# Patient Record
Sex: FEMALE | Employment: FULL TIME
[De-identification: names, ages, dates, MRNs, and addresses within clinical notes are randomized per-mention and may not be internally consistent; named-entity substitution may affect disease eponyms.]

---

## 2017-05-26 ENCOUNTER — HEALTH MAINTENANCE LETTER (OUTPATIENT)
Age: 42
End: 2017-05-26

## 2017-10-30 ENCOUNTER — OFFICE VISIT (OUTPATIENT)
Dept: FAMILY MEDICINE | Facility: CLINIC | Age: 42
End: 2017-10-30
Payer: COMMERCIAL

## 2017-10-30 VITALS
HEART RATE: 88 BPM | DIASTOLIC BLOOD PRESSURE: 87 MMHG | SYSTOLIC BLOOD PRESSURE: 138 MMHG | OXYGEN SATURATION: 96 % | TEMPERATURE: 98.2 F | HEIGHT: 60 IN | WEIGHT: 191.4 LBS | BODY MASS INDEX: 37.58 KG/M2

## 2017-10-30 DIAGNOSIS — Z86.39 HX OF THYROID NODULE: ICD-10-CM

## 2017-10-30 DIAGNOSIS — Z01.00 EXAMINATION OF EYES AND VISION: ICD-10-CM

## 2017-10-30 DIAGNOSIS — Z12.4 SCREENING FOR CERVICAL CANCER: ICD-10-CM

## 2017-10-30 DIAGNOSIS — Z13.29 SCREENING FOR THYROID DISORDER: ICD-10-CM

## 2017-10-30 DIAGNOSIS — Z00.00 ROUTINE GENERAL MEDICAL EXAMINATION AT A HEALTH CARE FACILITY: Primary | ICD-10-CM

## 2017-10-30 DIAGNOSIS — R06.83 SNORING: ICD-10-CM

## 2017-10-30 DIAGNOSIS — Z13.1 SCREENING FOR DIABETES MELLITUS: ICD-10-CM

## 2017-10-30 DIAGNOSIS — Z11.51 SCREENING FOR HUMAN PAPILLOMAVIRUS: ICD-10-CM

## 2017-10-30 DIAGNOSIS — Z13.220 SCREENING FOR LIPOID DISORDERS: ICD-10-CM

## 2017-10-30 LAB
CHOLEST SERPL-MCNC: 233 MG/DL
GLUCOSE BLD-MCNC: 101 MG/DL (ref 70–99)
HDLC SERPL-MCNC: 46 MG/DL
LDLC SERPL CALC-MCNC: 161 MG/DL
NONHDLC SERPL-MCNC: 187 MG/DL
TRIGL SERPL-MCNC: 129 MG/DL
TSH SERPL DL<=0.005 MIU/L-ACNC: 1.5 MU/L (ref 0.4–4)

## 2017-10-30 PROCEDURE — 87624 HPV HI-RISK TYP POOLED RSLT: CPT | Performed by: NURSE PRACTITIONER

## 2017-10-30 PROCEDURE — 80061 LIPID PANEL: CPT | Performed by: NURSE PRACTITIONER

## 2017-10-30 PROCEDURE — 99386 PREV VISIT NEW AGE 40-64: CPT | Performed by: NURSE PRACTITIONER

## 2017-10-30 PROCEDURE — 82947 ASSAY GLUCOSE BLOOD QUANT: CPT | Performed by: NURSE PRACTITIONER

## 2017-10-30 PROCEDURE — 36415 COLL VENOUS BLD VENIPUNCTURE: CPT | Performed by: NURSE PRACTITIONER

## 2017-10-30 PROCEDURE — 99212 OFFICE O/P EST SF 10 MIN: CPT | Mod: 25 | Performed by: NURSE PRACTITIONER

## 2017-10-30 PROCEDURE — G0145 SCR C/V CYTO,THINLAYER,RESCR: HCPCS | Performed by: NURSE PRACTITIONER

## 2017-10-30 PROCEDURE — 84443 ASSAY THYROID STIM HORMONE: CPT | Performed by: NURSE PRACTITIONER

## 2017-10-30 NOTE — PROGRESS NOTES
Pablo Mclaughlin,    Thank you for your recent office visit.    Here are your recent results.   Your cholesterol is elevated, try incorporating some of the below tips into your daily routine, we can repeat your fasting labs in one year.  Other labs are normal.     Feel free to contact me via [employer] or call the clinic at [phone].    Sincerely,    Cherie Ackerman, APRN, FNP-BC                     Controlling Cholesterol  What is cholesterol?   Cholesterol is a fatty substance. It has both good and bad effects on the body. Your body needs small amounts of cholesterol to make hormones and to build and maintain cells. However, when your body has too much cholesterol, deposits of fat called plaque form inside the walls of your blood vessels (arteries). The blood vessel walls thicken and the vessels become narrower. This is a condition called atherosclerosis. These changes make it harder for blood to flow through the blood vessels, increasing your risk of heart disease, heart attack, and stroke. The plaque can also easily break off and cause a blockage. When the artery is blocked, no blood can flow through it. This prevents the heart muscle from getting oxygen and can cause a heart attack. If a piece of plaque breaks off and flows to the brain, it can cause a stroke.   Most of the cholesterol in your blood is made by your liver from the fats, carbohydrates, and proteins you eat. You also get cholesterol by eating animal products such as meat, eggs, and high-fat dairy products such as whole milk, cream, and real butter.   It is important to find out what your cholesterol numbers are because lowering cholesterol levels that are too high lessens your risk for developing heart disease. It reduces the chance of a heart attack or death from heart disease, even if you already have heart disease.   How is cholesterol measured?   When you get your cholesterol checked, your healthcare provider will give you a number for your total  "cholesterol level. You can use the chart below to see if your total cholesterol is high.     Total Cholesterol Level (mg/dL)  ----------------------------------------  less than 200   good  200 to 239      borderline high  240 or above    high  ----------------------------------------    When your total cholesterol is measured and found to be high, your healthcare provider may also check the amount of LDL (low-density lipoprotein) and HDL (high-density lipoprotein) in your blood. LDL and HDL carry cholesterol through your blood. LDL carries a lot of cholesterol, leaves behind fatty deposits on your artery walls, and contributes to heart disease. HDL does the opposite. HDL cleans the artery walls and removes extra cholesterol from the body, thus lowering the risk of heart disease. LDL cholesterol is called bad cholesterol. (You can think of \"L\" for \"lousy\" cholesterol.) HDL cholesterol is called good cholesterol (think of \"H\" for \"healthy\" cholesterol). It is good to have low levels of LDL and high levels of HDL.   Because HDL cholesterol protects against heart disease, higher numbers are better. HDL levels of 60 mg/dL or more help to lower your risk for heart disease. A level equal to or less than 40 mg/dL is low and is considered a major risk factor because it increases your risk for developing heart disease   The level of LDL cholesterol that is healthy for you depends on your risk of heart disease and heart attack. In general, the higher your LDL level and the more risk factors you have for heart disease, the greater your chances of developing heart disease or having a heart attack. These are the recommended goals for LDL, according to risk level:   The goal is less than 160 mg/dL if your risk of heart disease is low.   The goal is less than 130 mg/dL if you have a moderate risk.   The goal is less than 100 mg/dL if you have a high risk of heart disease or you already have heart disease or diabetes.   For many " people with heart disease, especially if they also have diabetes, the goal is less than 70 mg/dL.   Lowering cholesterol, especially the LDL, is connected or linked with:   Slowing, stopping, or even reversing the buildup of plaque   Reducing the chances of heart attack by making plaques more stable and less likely to break off or rupture.   This means the chance of having a heart attack is much less.   In addition to high levels of total cholesterol and LDL, major risks for heart disease include:   diabetes   cigarette smoking   high blood pressure (140/90 mm Hg or higher or you are taking blood pressure medicine)   low HDL cholesterol (less than 40 mg/dL)   family history of early heart disease (father or brother diagnosed with heart disease before age 55, or mother or sister diagnosed before age 65)   age 45 or older for men and age 55 or older for women.   If you have diabetes, your risk of heart disease is high. If you do not have diabetes but you have 2 or more of the other risk factors in this list, your risk is moderate to high. Based on your personal and family history, your healthcare provider can help you calculate your risk level.   How can I control my cholesterol level?   High cholesterol may run in families. Know your family history and discuss it with your healthcare provider.   You can often control cholesterol levels by   eating right   exercising   not smoking   losing weight if you are overweight.   If you have a high risk for heart disease, your healthcare provider may prescribe cholesterol-lowering medicine as well as changes in lifestyle.   Eat right.  Follow these diet guidelines to help control your cholesterol:   Limit the cholesterol in your diet to less than 300 mg per day. If you have heart disease, limit cholesterol to less than 200 mg per day.   Be careful about the amounts and types of fat that you eat. Fats should contribute no more than 25 to 35% of your daily calories. Most of your  "dietary fat should be from polyunsaturated and monounsaturated fats, which are healthier than saturated fat and trans fats.   Saturated fat raises your blood cholesterol because it makes it hard for the body to clear the cholesterol away. Less than 7 to 10% of your calories should come from saturated fat. Saturated fat is found in different amounts in almost all foods. Butter, some oils, meat, and poultry fat contain a lot of saturated fat.   Trans fatty acids, often called trans fats, tend to raise your bad LDL cholesterol and lower your good HDL cholesterol. Trans fats naturally occur in some foods, mostly in meat and dairy products. But food makers can create trans fats when they are preparing food for grocery stores. This is usually done by adding hydrogen to fats. If the list of ingredients of a food product includes the words \"partially hydrogenated\" (usually referring to oils, such as soybean oil and others), the product is likely to contain trans fats. Try to eat as little trans fat as possible. As of January 2006, nutrition labels must list trans fats if the food contains them. Check the nutrition bar on the side of the package.   Polyunsaturated fats are found in fish and some vegetable oils. Monounsaturated fats are found in olive oil, canola oil, and avocados. Both types of these healthier fats are also found in many nuts and legumes.   Adjust the amount of calories you eat and exercise regularly, according to your healthcare provider's exercise prescription, to maintain your recommended body weight.   To control the cholesterol and types and amounts of fat you eat:   Check food labels for fat and cholesterol content. Choose the foods with less fat per serving.   Limit the amount of butter and margarine you eat.   Use olive, canola, sunflower, safflower, soybean, or corn oil. Avoid tropical oils such as palm or coconut oil. Also avoid oils that have been hydrogenated or partially hydrogenated.   Use " salad dressings and margarine made with polyunsaturated and monounsaturated fats.   Use egg whites or egg substitutes rather than whole eggs.   Replace whole-milk dairy products with nonfat or low-fat milk, cheese, spreads, and yogurt.   Eat skinless chicken, turkey, fish, and meatless entrees more often than red meat.   Choose lean cuts of meat and trim off all visible fat. Keep portion sizes moderate.   Avoid fatty desserts such as ice cream, cream-filled cakes, and cheesecakes. Choose fresh fruits, nonfat frozen yogurt, Popsicles, etc.   Reduce the amount of fried foods, vending machine food, and fast food you eat.   Eat several daily servings of fruits and vegetables (especially fresh fruits and leafy vegetables), beans, and whole grains (such as whole wheat, bran, brown rice, oats, and oatmeal). The fiber in these foods helps lower cholesterol.   Eat 4 to 5 servings of nuts a week. Examples of nuts that can be a part of a healthy diet are walnuts, almonds, hazelnuts, peanuts, pecans, and pistachio nuts.   Look for low-fat or nonfat varieties of the foods you like to eat, or look for substitutes.   Exercise.  Exercise goes hand-in-hand with a healthy diet for controlling cholesterol. Exercise helps because it:   Keeps your weight down.   Decreases your total cholesterol level.   Decreases your LDL (bad cholesterol).   Increases your HDL (good cholesterol).   A good exercise program includes aerobic exercise. Aerobic exercise is any activity that keeps your heart rate up (such as swimming, jogging, walking, and bicycling). You should get at least 30 minutes of moderate aerobic exercise most days of the week. Moderate aerobic exercise is generally defined as requiring the energy it takes to walk 2 miles in 30 minutes. You may need to exercise 60 minutes a day to prevent weight gain and 90 minutes a day to lose weight.   If you haven't been exercising, ask your healthcare provider for an exercise prescription and  "start your new exercise program slowly.   Don't smoke.  Do not smoke. Smoking increases your risk of heart disease because it lowers HDL levels, increases your risk of blood clots, and decreases oxygen to the tissues.   Lose excess weight.  Extra weight increases your risk for heart and blood vessel disease. One way it does this is by causing your LDL (\"bad\") cholesterol to go up. Extra weight can also make you tired. It takes a lot of energy to carry all those pounds around. The result is that you are less active. This can mean that you don't get enough exercise and gain even more weight.   Losing excess weight:   Improves not only the bad LDL cholesterol but other blood fats as well.   Lowers your risk for heart attack or stroke.   Increases your energy and helps you feel better (both physically and mentally) and become more active.   Your weight is primarily the result of 2 factors. One is the number of calories you consume. The other is the number of calories you \"burn\". If you eat more calories than you use, your body will store the extra calories as fat and your weight will go up. If your body uses more calories than you eat, you will lose weight.   Here are some things you can do to lose weight.   Talk to your healthcare provider about your weight. Ask how a change in diet and exercise will change your cholesterol levels. Plan for gradual weight loss, just 1 or 2 pounds per week   Eat fewer calories.   Get more physical activity.   Keep a diary of your food and exercise for a couple of weeks to become more aware of your habits.   In summary, changes that you can make in your lifestyle to control your cholesterol level are:   Eat healthy.   Get regular exercise.   Don't smoke.   Keep a healthy weight.   Have your cholesterol levels checked as often as your provider recommends.   Cholesterol in the Diet  Cholesterol is a fatty substance in your body and in foods made from animals. There is a lot of it in meat, " including beef, pork, chicken, and turkey. Whole-milk dairy products, egg yolks, and shellfish also have a lot of cholesterol.   Your body needs cholesterol to make hormones and build nerve cells. You don't have to get it from food because your body makes cholesterol. If you eat too many foods high in cholesterol or saturated fat you can get too much cholesterol. It can cause high levels of cholesterol in the blood. High cholesterol increases your risk for heart disease.   How are saturated fat and trans fats related to cholesterol levels?   Like cholesterol, saturated fats are found mostly in animal products. Limiting the saturated fat in your diet is just as important as limiting cholesterol because your body makes more cholesterol when you eat saturated fat. Trans fats are another type of fat in animal products. Trans fats are also in many processed foods, such as cakes, cookies and potato chips. Like saturated fat, trans fats raise cholesterol levels in the blood.   How much cholesterol do animal products have?   As the table below shows, some foods have more cholesterol and saturated fat than others. They may be high in both, or low in both, or high in one but not the other. The healthiest diets include mostly foods that are low in cholesterol, saturated, and trans fat.   Most foods in the meat group have about the same amount of cholesterol per serving, regardless of the type or cut of meat. However, the amount of saturated fat in these various meats can be very different. High-fat cuts, such as prime rib and dark-meat poultry with the skin, contain a lot more saturated fat than lean cuts, such as pork tenderloin and chicken breast without skin.   Whole-milk dairy products, such as whole milk, cheese, ice cream, sour cream, and butter, have a lot of cholesterol and saturated fat. The good news is that food producers can remove both cholesterol and saturated fat from dairy foods. When dairy is skimmed of its  fats, the cholesterol is skimmed off along with it. Skim (nonfat) dairy products are a healthy food choice.   Shellfish are low in saturated fat. Some shellfish are high in cholesterol, but the saturated fat is so low that these foods are still healthy. Fin fish, such as salmon, tuna, trout, and halibut, are relatively low in cholesterol and saturated fat.   Cholesterol and Saturated Fat Content of Selected Foods     Food                                 Fat             Cholesterol                                      (grams)         (milligrams)  --------------------------------------------------------------------  8 ounces (oz) whole milk             4.5 g               25 mg  8 ounces skim milk                   0.36 g               5 mg  1 tablespoon butter                  7 g                 30 mg  4 tablespoon sour cream              5.5 g               24 mg  3 oz. pork tenderloin                2 g                 65 mg  3 oz. pork sausage                   7.5 g               70 mg  3 oz sirloin steak                   3 g                 76 mg  3 oz beef ribs                       5 g                 69 mg  3 oz chicken breast without skin     1 g                 73 mg  3 oz chicken thigh with skin         3.7 g               79 mg  3 oz shrimp                          0.25               166 mg  3 oz salmon                          1.5                 50 mg  1/2 cup vegetable shortening        25.5 g                0 mg   ---------------------------------------------------------------------    How much cholesterol can I have in my diet?   The guidelines for cholesterol in the diet depend on your medical condition. The recommendations are:   less than 200 mg a day if you have high cholesterol or heart disease   less than 300 mg of cholesterol a day if you do not have high cholesterol or heart disease.   Everyone should try to avoid saturated and trans fats.   Limiting cholesterol, saturated fat, and trans  fat is easy if you get in the habit of cooking lean. Choose the leanest cuts of meats and dairy products, including more fish and less processed food. Some plant foods, such as palm oil, coconut oil, and cocoa butter do contain saturated fat, but it is not known if these fats have the same harmful effect on the heart as the saturated fat in animal products. Plant foods, such as grains, fruits, vegetables, vegetable oils, nuts, and seeds, do not contain any cholesterol.     Published by [employer].  This content is reviewed periodically and is subject to change as new health information becomes available. The information is intended to inform and educate and is not a replacement for medical evaluation, advice, diagnosis or treatment by a healthcare professional.   Ping Merida RD, CDE   ? 2010 [facility] and/or its affiliates. All Rights Reserved.

## 2017-10-30 NOTE — MR AVS SNAPSHOT
After Visit Summary   10/30/2017    Lissette England    MRN: [id number]           Patient Information     Date Of Birth          1975        Visit Information        Provider Department      10/30/2017 7:40 AM Cherie Ackerman NP [facility]        Today's Diagnoses     Hx of thyroid nodule    -  1    Screening for thyroid disorder        Screening for human papillomavirus        Screening for cervical cancer        Examination of eyes and vision        Screening for lipoid disorders        Screening for diabetes mellitus        Snoring          Care Instructions      Preventive Health Recommendations  Female Ages 40 to 49    Yearly exam:     See your health care provider every year in order to  1. Review health changes.   2. Discuss preventive care.    3. Review your medicines if your doctor prescribed any.      Get a Pap test every three years (unless you have an abnormal result and your provider advises testing more often).      If you get Pap tests with HPV test, you only need to test every 5 years, unless you have an abnormal result. You do not need a Pap test if your uterus was removed (hysterectomy) and you have not had cancer.      You should be tested each year for STDs (sexually transmitted diseases), if you're at risk.       Ask your doctor if you should have a mammogram.      Have a colonoscopy (test for colon cancer) if someone in your family has had colon cancer or polyps before age 50.       Have a cholesterol test every 5 years.       Have a diabetes test (fasting glucose) after age 45. If you are at risk for diabetes, you should have this test every 3 years.    Shots: Get a flu shot each year. Get a tetanus shot every 10 years.     Nutrition:     Eat at least 5 servings of fruits and vegetables each day.    Eat whole-grain bread, whole-wheat pasta and brown rice instead of white grains and rice.    Talk to your provider about Calcium and Vitamin D.      Lifestyle    Exercise at least 150 minutes a week (an average of 30 minutes a day, 5 days a week). This will help you control your weight and prevent disease.    Limit alcohol to one drink per day.    No smoking.     Wear sunscreen to prevent skin cancer.  See your dentist every six months for an exam and cleaning.  Monitoring Your Sleep: Home Sleep Study    A home sleep study tracks and records body functions while you re asleep in your own bed. The results of the study will help diagnose your sleep problem and plan your treatment.  How a home sleep study works  During a sleep study, sensors attached to your body measure your breathing, oxygen level, and other body functions. You will be shown how to attach the sensors to your body. You may also have help from a technician. At bedtime you plug the sensors into a small computer and turn it on. In the morning, you will remove the sensors and return the computer so the results can be studied.  Tips  You ll be given instructions for how to set up the sensors and the computer. Doing so will be simple. For best results:  Go through the instructions during the day so you ll be ready to use the equipment at bedtime.  Stick to your normal routine. Ask your healthcare provider if you should do anything differently the night of the study.  If you get up during the night, reconnect the sensors to the computer or to yourself correctly.  Get as many hours of sleep as you can.  Getting the results  The results of your sleep study need to be scored and interpreted. Once this is done, your healthcare provider will discuss the findings with you. The sleep study results will show whether you have apnea. This is when your breathing stops temporarily many times during the night, awakening you briefly.  It can also tell how severe the apnea is. The findings help your healthcare provider know which treatment or treatments may be the right ones for you.  Date Last Reviewed:  8/9/2015 2000-2017 [employer]. [address] [place] [address], [place] [address]. All rights reserved. This information is not intended as a substitute for professional medical care. Always follow your healthcare professional's instructions.        Tips to Help Prevent Snoring    Your snoring may get better if you make a few simple changes in your sleeping and waking habits. These changes might be all you need to improve or even cure your snoring, or they may work best when used along with other types of treatment.  Sleep on your side  Sleeping on your side may keep throat tissue from blocking your air passage. This may improve or even cure snoring. But it can be hard to stop sleeping on your back. Try sewing a pocket or sock onto the back of a T-shirt or pajama top. Put a few tennis balls or a bag of unshelled nuts into this pocket or sock, then wear the shirt to bed. This will help keep you from rolling onto your back. If this doesn't work, try wearing a backpack full of foam pieces, or put a wedge-shaped pillow behind you. You can [employer] purchase devices online.  Avoid alcohol and certain medicines  Alcohol and medicines such as sedatives, sleeping pills, and antihistamines make breathing slower and more shallow. They also make your muscles relax, so structures in your throat can block your air passage. These changes can cause or worsen snoring. If you snore, avoid alcohol. Talk to your healthcare provider if you take medicines to help you sleep.  Lose weight  Too much weight can make snoring worse. Extra weight puts pressure on your neck tissues and lungs, making breathing harder. If you're overweight, ask your healthcare provider about a weight-loss program.  Exercise regularly  Exercise can help you lose weight, tone your muscles, and make your lungs work better. These changes may help improve your snoring. Ask your healthcare provider about an exercise program like walking, or something else that you  enjoy.  Unblock your nose  If something blocks your nose, treating the problem may help improve snoring. Your healthcare provider can suggest medications for allergies or sinus problems. Nasal saline rinses can also open up the nasal passages.Nasal strips applied on the bridge of the nose can aid breathing. Surgery can straighten a deviated septum, reduce the size of the turbinates, or remove polyps (growths). If you smoke, try to quit because smoking makes a stuffy nose worse.  Understanding the risks of sleep apnea  In some cases, snoring is not physically harmful, but it can be associated with a more serious condition called sleep apnea. Some common symptoms of sleep apnea include:  Loud, frequent snoring  Heavy daytime drowsiness  Difficulty breathing during sleep  Headaches upon awakening  If you are concerned that you might have sleep apnea, talk with your healthcare provider about tests and treatments that may help.   Date Last Reviewed: 8/9/2015 2000-2017 [employer]. [address] [place] [address]. All rights reserved. This information is not intended as a substitute for professional medical care. Always follow your healthcare professional's instructions.                  Follow-ups after your visit        Additional Services     OPTOMETRY REFERRAL       Your provider has referred you to: FMG: [facility] [phone]   [email or web address]  FMG: [facility] [phone]    [email or web address]  FMG: [facility] [phone]    [email or web address]  FMG: [facility] [phone]    [email or web address]  [facility]: [facility] [phone]   [email or web address]  FHN: [facility] [phone]   [email or web address]    Please be aware that coverage of these services is subject to the terms and limitations of your health insurance plan.  Call member services at your health plan with any benefit or coverage questions.      Please bring the following with you to your appointment:    (1) Any X-Rays, CTs or MRIs which have been performed.  Contact the facility where they were done to arrange for  prior to your scheduled appointment.    (2) List of current medications  (3) This referral request   (4) Any documents/labs given to you for this referral            SLEEP HOME STUDY REFERRAL       Snoring, observed sleep apnea x 1                  Follow-up notes from your care team     Return if symptoms worsen or fail to improve.      Future tests that were ordered for you today     Open Future Orders        Priority Expected Expires Ordered    US Thyroid Routine  10/30/2018 10/30/2017            Who to contact     Normal or non-critical lab and imaging results will be communicated to you by [employer], letter or phone within 4 business days after the clinic has received the results. If you do not hear from us within 7 days, please contact the clinic through [employer] or phone. If you have a critical or abnormal lab result, we will notify you by phone as soon as possible.  Submit refill requests through [employer] or call your pharmacy and they will forward the refill request to us. Please allow 3 business days for your refill to be completed.          If you need to speak with a  for additional information , please call: [phone]             Additional Information About Your Visit        [employer] Information     [employer] gives you secure access to your electronic health record. If you see a primary care provider, you can also send messages to your care team and make appointments. If you have questions, please call your primary care clinic.  If you do not have a primary care  provider, please call [phone] and they will assist you.        Care EveryWhere ID     This is your Care EveryWhere ID. This could be used by other organizations to access your [place] medical records  [id number]        Your Vitals Were     Pulse Temperature Height Last Period Pulse Oximetry Breastfeeding?    88 98.2  F (36.8  C) (Oral) 5' (1.524 m) 10/16/2017 (Approximate) 96% No    BMI (Body Mass Index)                   37.38 kg/m2            Blood Pressure from Last 3 Encounters:   10/30/17 138/87    Weight from Last 3 Encounters:   10/30/17 191 lb 6.4 oz (86.8 kg)              We Performed the Following     Glucose, whole blood     HPV High Risk Types DNA Cervical     Lipid panel reflex to direct LDL Fasting     OPTOMETRY REFERRAL     Pap imaged thin layer screen with HPV - recommended age 30 - 65 years (select HPV order below)     SLEEP HOME STUDY REFERRAL     TSH with free T4 reflex        Primary Care Provider Office Phone # Fax #    Cherie Ackerman [phone]       [address] [facility] [address]        Equal Access to Services     [facility]: Hadii aad ku hadasho Soomaali, waaxda luqadaha, qaybta kaalmada adeegyapat, denis tellez . So [facility] [place] [facility] [phone].    ATENCIÓN: Si habla español, tiene a castle disposición servicios gratuitos de asistencia lingüística. [facility] [phone].    We comply with applicable federal civil rights laws and [place] laws. We do not discriminate on the basis of race, color, national origin, age, disability, sex, sexual orientation, or gender identity.            Thank you!     Thank you for choosing [facility]  for your care. Our goal is always to provide you with excellent care. Hearing back from our patients is one way we can continue to improve our services. Please take a few minutes to complete the written survey that you may receive in the mail after your visit with us. Thank you!             Your  Updated Medication List - Protect others around you: Learn how to safely use, store and throw away your medicines at www.disposemymeds.org.      Notice  As of 10/30/2017  8:07 AM    You have not been prescribed any medications.

## 2017-10-30 NOTE — PATIENT INSTRUCTIONS
Preventive Health Recommendations  Female Ages 40 to 49    Yearly exam:     See your health care provider every year in order to  1. Review health changes.   2. Discuss preventive care.    3. Review your medicines if your doctor prescribed any.      Get a Pap test every three years (unless you have an abnormal result and your provider advises testing more often).      If you get Pap tests with HPV test, you only need to test every 5 years, unless you have an abnormal result. You do not need a Pap test if your uterus was removed (hysterectomy) and you have not had cancer.      You should be tested each year for STDs (sexually transmitted diseases), if you're at risk.       Ask your doctor if you should have a mammogram.      Have a colonoscopy (test for colon cancer) if someone in your family has had colon cancer or polyps before age 50.       Have a cholesterol test every 5 years.       Have a diabetes test (fasting glucose) after age 45. If you are at risk for diabetes, you should have this test every 3 years.    Shots: Get a flu shot each year. Get a tetanus shot every 10 years.     Nutrition:     Eat at least 5 servings of fruits and vegetables each day.    Eat whole-grain bread, whole-wheat pasta and brown rice instead of white grains and rice.    Talk to your provider about Calcium and Vitamin D.     Lifestyle    Exercise at least 150 minutes a week (an average of 30 minutes a day, 5 days a week). This will help you control your weight and prevent disease.    Limit alcohol to one drink per day.    No smoking.     Wear sunscreen to prevent skin cancer.  See your dentist every six months for an exam and cleaning.  Monitoring Your Sleep: Home Sleep Study    A home sleep study tracks and records body functions while you re asleep in your own bed. The results of the study will help diagnose your sleep problem and plan your treatment.  How a home sleep study works  During a sleep study, sensors attached to your  body measure your breathing, oxygen level, and other body functions. You will be shown how to attach the sensors to your body. You may also have help from a technician. At bedtime you plug the sensors into a small computer and turn it on. In the morning, you will remove the sensors and return the computer so the results can be studied.  Tips  You ll be given instructions for how to set up the sensors and the computer. Doing so will be simple. For best results:  Go through the instructions during the day so you ll be ready to use the equipment at bedtime.  Stick to your normal routine. Ask your healthcare provider if you should do anything differently the night of the study.  If you get up during the night, reconnect the sensors to the computer or to yourself correctly.  Get as many hours of sleep as you can.  Getting the results  The results of your sleep study need to be scored and interpreted. Once this is done, your healthcare provider will discuss the findings with you. The sleep study results will show whether you have apnea. This is when your breathing stops temporarily many times during the night, awakening you briefly.  It can also tell how severe the apnea is. The findings help your healthcare provider know which treatment or treatments may be the right ones for you.  Date Last Reviewed: 8/9/2015 2000-2017 [employer]. [address] [place] [address], [place] [address]. All rights reserved. This information is not intended as a substitute for professional medical care. Always follow your healthcare professional's instructions.        Tips to Help Prevent Snoring    Your snoring may get better if you make a few simple changes in your sleeping and waking habits. These changes might be all you need to improve or even cure your snoring, or they may work best when used along with other types of treatment.  Sleep on your side  Sleeping on your side may keep throat tissue from blocking your air passage.  This may improve or even cure snoring. But it can be hard to stop sleeping on your back. Try sewing a pocket or sock onto the back of a T-shirt or pajama top. Put a few tennis balls or a bag of unshelled nuts into this pocket or sock, then wear the shirt to bed. This will help keep you from rolling onto your back. If this doesn't work, try wearing a backpack full of foam pieces, or put a wedge-shaped pillow behind you. You can [employer] purchase devices online.  Avoid alcohol and certain medicines  Alcohol and medicines such as sedatives, sleeping pills, and antihistamines make breathing slower and more shallow. They also make your muscles relax, so structures in your throat can block your air passage. These changes can cause or worsen snoring. If you snore, avoid alcohol. Talk to your healthcare provider if you take medicines to help you sleep.  Lose weight  Too much weight can make snoring worse. Extra weight puts pressure on your neck tissues and lungs, making breathing harder. If you're overweight, ask your healthcare provider about a weight-loss program.  Exercise regularly  Exercise can help you lose weight, tone your muscles, and make your lungs work better. These changes may help improve your snoring. Ask your healthcare provider about an exercise program like walking, or something else that you enjoy.  Unblock your nose  If something blocks your nose, treating the problem may help improve snoring. Your healthcare provider can suggest medications for allergies or sinus problems. Nasal saline rinses can also open up the nasal passages.Nasal strips applied on the bridge of the nose can aid breathing. Surgery can straighten a deviated septum, reduce the size of the turbinates, or remove polyps (growths). If you smoke, try to quit because smoking makes a stuffy nose worse.  Understanding the risks of sleep apnea  In some cases, snoring is not physically harmful, but it can be associated with a more serious condition  called sleep apnea. Some common symptoms of sleep apnea include:  Loud, frequent snoring  Heavy daytime drowsiness  Difficulty breathing during sleep  Headaches upon awakening  If you are concerned that you might have sleep apnea, talk with your healthcare provider about tests and treatments that may help.   Date Last Reviewed: 8/9/2015 2000-2017 [employer]. [address] [place] [address], [place] [address]. All rights reserved. This information is not intended as a substitute for professional medical care. Always follow your healthcare professional's instructions.

## 2017-10-30 NOTE — LETTER
November 4, 2017    Lissette England  [address] [place] [address]    Dear Lissette,  We are happy to inform you that your PAP smear result from 10/30/17 is normal.  We are now able to do a follow up test on PAP smears. The DNA test is for HPV (Human Papilloma Virus). Cervical cancer is closely linked with certain types of HPV. Your result showed no evidence of high risk HPV.  Therefore we recommend you return in 3 years for your next pap smear.  You will still need to return to the clinic every year for an annual exam and other preventive tests.  Please contact the clinic at [phone] with any questions.  Sincerely,    Cherie Ackerman NP/rafaela

## 2017-10-30 NOTE — NURSING NOTE
Chief Complaint   Patient presents with     Physical       Initial /87  Pulse 88  Temp 98.2  F (36.8  C) (Oral)  Ht 5' (1.524 m)  Wt 191 lb 6.4 oz (86.8 kg)  LMP 10/16/2017 (Approximate)  SpO2 96%  Breastfeeding? No  BMI 37.38 kg/m2 Estimated body mass index is 37.38 kg/(m^2) as calculated from the following:    Height as of this encounter: 5' (1.524 m).    Weight as of this encounter: 191 lb 6.4 oz (86.8 kg).  Medication Reconciliation: complete     Juliana Page MA

## 2017-10-30 NOTE — PROGRESS NOTES
SUBJECTIVE:   CC: Lissette England is an 41 year old woman who presents for preventive health visit.     Healthy Habits:    Do you get at least three servings of calcium containing foods daily (dairy, green leafy vegetables, etc.)? yes    Amount of exercise or daily activities, outside of work: 0 day(s) per week    Problems taking medications regularly No    Medication side effects: No    Have you had an eye exam in the past two years? no    Do you see a dentist twice per year? yes    Do you have sleep apnea, excessive snoring or daytime drowsiness?yes    Patient/Parent of patient informed that anything we discuss that is not related to preventative medicine, may be billed for; patient verbalizes understanding.    Concern(s):  1. snoring  2. Blood work for cholesterol  3. Thyroid check- is supposed to have annual thyroid US d/t nodules, partial removal        Today's PHQ-2 Score: No flowsheet data found.      Abuse: Current or Past(Physical, Sexual or Emotional)- No  Do you feel safe in your environment - Yes  Social History   Substance Use Topics     Smoking status: Current Every Day Smoker     Types: Cigarettes     Smokeless tobacco: Never Used     Alcohol use Yes     The patient does not drink >3 drinks per day nor >7 drinks per week.    Reviewed orders with patient.  Reviewed health maintenance and updated orders accordingly - Yes  Labs reviewed in EPIC  BP Readings from Last 3 Encounters:   10/30/17 138/87    Wt Readings from Last 3 Encounters:   10/30/17 191 lb 6.4 oz (86.8 kg)                  Patient Active Problem List   Diagnosis     CARDIOVASCULAR SCREENING; LDL GOAL LESS THAN 160     History reviewed. No pertinent surgical history.    Social History   Substance Use Topics     Smoking status: Current Every Day Smoker     Types: Cigarettes     Smokeless tobacco: Never Used     Alcohol use Yes     Family History   Problem Relation Age of Onset     Hypertension Mother          No current outpatient  prescriptions on file.     No Known Allergies        Patient under age 50, mutual decision reflected in health maintenance.        Pertinent mammograms are reviewed under the imaging tab.  History of abnormal Pap smear: NO - age 30- 65 PAP every 3 years recommended    Reviewed and updated as needed this visit by clinical staffTobacco  Allergies  Meds  Med Hx  Surg Hx  Fam Hx  Soc Hx        Reviewed and updated as needed this visit by Provider        History reviewed. No pertinent past medical history.   History reviewed. No pertinent surgical history.  Obstetric History     No data available          ROS:  C: NEGATIVE for fever, chills, change in weight  I: NEGATIVE for worrisome rashes, moles or lesions  E: NEGATIVE for vision changes or irritation  ENT: NEGATIVE for ear, mouth and throat problems  R: NEGATIVE for significant cough or SOB  B: NEGATIVE for masses, tenderness or discharge  CV: NEGATIVE for chest pain, palpitations or peripheral edema  GI: NEGATIVE for nausea, abdominal pain, heartburn, or change in bowel habits  : NEGATIVE for unusual urinary or vaginal symptoms. Periods are regular.  M: NEGATIVE for significant arthralgias or myalgia  N: NEGATIVE for weakness, dizziness or paresthesias  E: NEGATIVE for temperature intolerance, skin/hair changes  H: NEGATIVE for bleeding problems  P: NEGATIVE for changes in mood or affect    OBJECTIVE:   /87  Pulse 88  Temp 98.2  F (36.8  C) (Oral)  Ht 5' (1.524 m)  Wt 191 lb 6.4 oz (86.8 kg)  LMP 10/16/2017 (Approximate)  SpO2 96%  Breastfeeding? No  BMI 37.38 kg/m2  EXAM:  GENERAL: healthy, alert and no distress  EYES: Eyes grossly normal to inspection, PERRL and conjunctivae and sclerae normal  HENT: ear canals and TM's normal, nose and mouth without ulcers or lesions  NECK: no adenopathy, no asymmetry, masses, or scars and thyroid normal to palpation  RESP: lungs clear to auscultation - no rales, rhonchi or wheezes  BREAST: normal without  masses, tenderness or nipple discharge and no palpable axillary masses or adenopathy  CV: regular rate and rhythm, normal S1 S2, no S3 or S4, no murmur, click or rub, no peripheral edema and peripheral pulses strong  ABDOMEN: soft, nontender, no hepatosplenomegaly, no masses and bowel sounds normal   (female): normal female external genitalia, normal urethral meatus, vaginal mucosa pink, moist, well rugated, and normal cervix/adnexa/uterus without masses or discharge  RECTAL: normal sphincter tone  MS: no gross musculoskeletal defects noted, no edema  SKIN: no suspicious lesions or rashes  NEURO: Normal strength and tone, mentation intact and speech normal  PSYCH: mentation appears normal, affect normal/bright  LYMPH: no cervical, supraclavicular, axillary adenopathy    ASSESSMENT/PLAN:       ICD-10-CM    1. Routine general medical examination at a health care facility Z00.00    2. Hx of thyroid nodule Z86.39 TSH with free T4 reflex     US Thyroid   3. Screening for thyroid disorder Z13.29 TSH with free T4 reflex   4. Screening for human papillomavirus Z11.51 HPV High Risk Types DNA Cervical   5. Screening for cervical cancer Z12.4 Pap imaged thin layer screen with HPV - recommended age 30 - 65 years (select HPV order below)   6. Examination of eyes and vision Z01.00 OPTOMETRY REFERRAL   7. Screening for lipoid disorders Z13.220 Lipid panel reflex to direct LDL Fasting   8. Screening for diabetes mellitus Z13.1 Glucose, whole blood   9. Snoring R06.83 SLEEP HOME STUDY REFERRAL       COUNSELING:   Reviewed preventive health counseling, as reflected in patient instructions    BP Screening:   Last 3 BP Readings:    BP Readings from Last 3 Encounters:   10/30/17 138/87       The following was recommended to the patient:  Re-screen BP within a year and recommended lifestyle modifications     reports that she has been smoking Cigarettes.  She has never used smokeless tobacco.  Tobacco Cessation Action Plan: Information  offered: Patient not interested at this time  Estimated body mass index is 37.38 kg/(m^2) as calculated from the following:    Height as of this encounter: 5' (1.524 m).    Weight as of this encounter: 191 lb 6.4 oz (86.8 kg).   Weight management plan: Discussed healthy diet and exercise guidelines and patient will follow up in 12 months in clinic to re-evaluate.    Counseling Resources:  ATP IV Guidelines  Pooled Cohorts Equation Calculator  Breast Cancer Risk Calculator  FRAX Risk Assessment  ICSI Preventive Guidelines  Dietary Guidelines for Americans, 2010  USDA's MyPlate  ASA Prophylaxis  Lung CA Screening    DU Dawson  [facility]

## 2017-11-01 LAB
COPATH REPORT: NORMAL
PAP: NORMAL

## 2017-11-02 LAB
FINAL DIAGNOSIS: NORMAL
HPV HR 12 DNA CVX QL NAA+PROBE: NEGATIVE
HPV16 DNA SPEC QL NAA+PROBE: NEGATIVE
HPV18 DNA SPEC QL NAA+PROBE: NEGATIVE
SPECIMEN DESCRIPTION: NORMAL

## 2017-11-28 ENCOUNTER — RADIANT APPOINTMENT (OUTPATIENT)
Dept: ULTRASOUND IMAGING | Facility: CLINIC | Age: 42
End: 2017-11-28
Attending: NURSE PRACTITIONER
Payer: COMMERCIAL

## 2017-11-28 DIAGNOSIS — Z86.39 HX OF THYROID NODULE: ICD-10-CM

## 2017-11-28 DIAGNOSIS — R93.89 ABNORMAL THYROID ULTRASOUND: Primary | ICD-10-CM

## 2017-11-28 PROCEDURE — 76536 US EXAM OF HEAD AND NECK: CPT

## 2017-11-28 NOTE — PROGRESS NOTES
Pablo Mclaughlin,    Thank you for your recent office visit.    Here are your recent results.  Your thyroid ultrasound showed: IMPRESSION:  1. Right thyroidectomy.  2. Left lobe nodules, the largest measuring 1.2 cm in maximal  Diameter.    I have placed an order for endocrinology.  Please schedule with them to discuss treatment plan.     Feel free to contact me via [employer] or call the clinic at [phone].    Sincerely,    ASIA To, FNP-BC

## 2017-12-20 ENCOUNTER — TELEPHONE (OUTPATIENT)
Dept: FAMILY MEDICINE | Facility: CLINIC | Age: 42
End: 2017-12-20

## 2017-12-20 NOTE — TELEPHONE ENCOUNTER
Call from patient, she is looking to get a referral for [facility] to be seen today. Advised her that it could take up to a week for insurance referral to be processed. Patient will cancel appointment and see someone else.     Thank you,   Jennifer Salas   Referral Department  [phone]

## 2017-12-29 ENCOUNTER — RADIANT APPOINTMENT (OUTPATIENT)
Dept: GENERAL RADIOLOGY | Facility: CLINIC | Age: 42
End: 2017-12-29
Attending: PHYSICIAN ASSISTANT
Payer: COMMERCIAL

## 2017-12-29 ENCOUNTER — OFFICE VISIT (OUTPATIENT)
Dept: FAMILY MEDICINE | Facility: CLINIC | Age: 42
End: 2017-12-29
Payer: COMMERCIAL

## 2017-12-29 VITALS
WEIGHT: 188.6 LBS | BODY MASS INDEX: 36.83 KG/M2 | DIASTOLIC BLOOD PRESSURE: 84 MMHG | TEMPERATURE: 98.6 F | OXYGEN SATURATION: 96 % | HEART RATE: 76 BPM | RESPIRATION RATE: 16 BRPM | SYSTOLIC BLOOD PRESSURE: 116 MMHG

## 2017-12-29 DIAGNOSIS — M54.2 CERVICALGIA: ICD-10-CM

## 2017-12-29 DIAGNOSIS — M79.675 PAIN OF TOE OF LEFT FOOT: ICD-10-CM

## 2017-12-29 DIAGNOSIS — M62.89 MUSCLE TIGHTNESS: ICD-10-CM

## 2017-12-29 DIAGNOSIS — M54.12 CERVICAL RADICULOPATHY: ICD-10-CM

## 2017-12-29 DIAGNOSIS — Z72.0 TOBACCO USE: Primary | ICD-10-CM

## 2017-12-29 PROCEDURE — 73630 X-RAY EXAM OF FOOT: CPT | Mod: LT

## 2017-12-29 PROCEDURE — 99214 OFFICE O/P EST MOD 30 MIN: CPT | Performed by: PHYSICIAN ASSISTANT

## 2017-12-29 RX ORDER — BUPROPION HYDROCHLORIDE 150 MG/1
TABLET, EXTENDED RELEASE ORAL
Qty: 180 TABLET | Refills: 3 | Status: SHIPPED | OUTPATIENT
Start: 2017-12-29 | End: 2019-05-21

## 2017-12-29 RX ORDER — CYCLOBENZAPRINE HCL 10 MG
10 TABLET ORAL
Qty: 14 TABLET | Refills: 1 | Status: SHIPPED | OUTPATIENT
Start: 2017-12-29 | End: 2018-10-29

## 2017-12-29 RX ORDER — PREDNISONE 20 MG/1
TABLET ORAL
Qty: 20 TABLET | Refills: 0 | Status: SHIPPED | OUTPATIENT
Start: 2017-12-29 | End: 2018-10-29

## 2017-12-29 NOTE — MR AVS SNAPSHOT
After Visit Summary   12/29/2017    Lissette England    MRN: [id number]           Patient Information     Date Of Birth          1975        Visit Information        Provider Department      12/29/2017 9:20 AM Charito Hansen PA-C [facility]        Today's Diagnoses     Tobacco use    -  1    Pain of toe of left foot        Cervicalgia        Cervical radiculopathy        Muscle tightness          Care Instructions    Start Zyban/buproprion as soon as you get the prescription. Your wean process should be as follows (however, you can always quit completely at anytime in the next 3 months):    Starting cigarette intake per day: 10    Week 2: decrease your cigarette intake by 25%, down to 8 cigarettes per day.    Week 4: decrease your cigarette intake by 50%, down to 5 cigarettes per day.    Week 6: decrease your cigarette intake by 75%, down to 3 cigarettes per day.    Week 8: decrease your cigarette intake by 90%, down to 1 cigarettes per day.    Week 10: quit smoking altogether!             Follow-ups after your visit        Your next 10 appointments already scheduled     Dec 29, 2017 10:00 AM CST   XR FOOT LEFT G/E 3 VIEWS with [id number]   [facility] ([facility])    [address]  [facility] [address] [phone]           Please bring a list of your current medicines to your exam. (Include vitamins, minerals and over-thecounter medicines.) Leave your valuables at home.  Tell your doctor if there is a chance you may be pregnant.  You do not need to do anything special for this exam.              Future tests that were ordered for you today     Open Future Orders        Priority Expected Expires Ordered    MR Cervical Spine w/o Contrast Routine  12/29/2018 12/29/2017    XR Foot Left G/E 3 Views Routine 12/29/2017 12/29/2018 12/29/2017            Who to contact     Normal or non-critical lab and imaging  results will be communicated to you by [employer], letter or phone within 4 business days after the clinic has received the results. If you do not hear from us within 7 days, please contact the clinic through [employer] or phone. If you have a critical or abnormal lab result, we will notify you by phone as soon as possible.  Submit refill requests through [employer] or call your pharmacy and they will forward the refill request to us. Please allow 3 business days for your refill to be completed.          If you need to speak with a  for additional information , please call: [phone]             Additional Information About Your Visit        Intelligent[employer] Information     [employer] gives you secure access to your electronic health record. If you see a primary care provider, you can also send messages to your care team and make appointments. If you have questions, please call your primary care clinic.  If you do not have a primary care provider, please call [phone] and they will assist you.        Care EveryWhere ID     This is your Care EveryWhere ID. This could be used by other organizations to access your [place] medical records  [id number]        Your Vitals Were     Pulse Temperature Respirations Pulse Oximetry BMI (Body Mass Index)       76 98.6  F (37  C) (Oral) 16 96% 36.83 kg/m2        Blood Pressure from Last 3 Encounters:   12/29/17 116/84   10/30/17 138/87    Weight from Last 3 Encounters:   12/29/17 188 lb 9.6 oz (85.5 kg)   10/30/17 191 lb 6.4 oz (86.8 kg)                 Today's Medication Changes          These changes are accurate as of: 12/29/17  9:54 AM.  If you have any questions, ask your nurse or doctor.               Start taking these medicines.        Dose/Directions    buPROPion 150 MG 12 hr tablet   Commonly known as:  WELLBUTRIN SR   Used for:  Tobacco use   Started by:  Charito Hansen PA-C        Take 1 tablet once daily and increase to 1 tablet twice daily after 7 days    Quantity:  180 tablet   Refills:  3       cyclobenzaprine 10 MG tablet   Commonly known as:  FLEXERIL   Used for:  Muscle tightness   Started by:  Charito Hansen PA-C        Dose:  10 mg   Take 1 tablet (10 mg) by mouth nightly as needed for muscle spasms   Quantity:  14 tablet   Refills:  1       predniSONE 20 MG tablet   Commonly known as:  DELTASONE   Used for:  Cervical radiculopathy, Cervicalgia   Started by:  Charito Hansen PA-C        Take 3 tabs (60 mg) by mouth daily x 3 days, 2 tabs (40 mg) daily x 3 days, 1 tab (20 mg) daily x 3 days, then 1/2 tab (10 mg) x 4 days.   Quantity:  20 tablet   Refills:  0            Where to get your medicines      These medications were sent to [facility] [id number]  [place], [place] - [address] [address] [facility] [address]  [address] [facility] [address]     Phone:  [phone]     buPROPion 150 MG 12 hr tablet    cyclobenzaprine 10 MG tablet    predniSONE 20 MG tablet                Primary Care Provider Office Phone # Fax #    Cherie Ackerman [phone]       [address] [address]  [place] [address]        Equal Access to Services     [facility] AH: Hadii aad ku hadasho Soomaali, waaxda luqadaha, qaybta kaalmada adeegyada, waxay idiin hayaan aderosalva khdebra tellez . So [facility] [phone].    ATENCIÓN: Si habla español, tiene a castle disposición servicios gratuitos de asistencia lingüística. Llame al [phone].    We comply with applicable federal civil rights laws and [place] laws. We do not discriminate on the basis of race, color, national origin, age, disability, sex, sexual orientation, or gender identity.            Thank you!     Thank you for choosing [facility]  for your care. Our goal is always to provide you with excellent care. Hearing back from our patients is one way we can continue to improve our services. Please take a few minutes to complete the written survey that you may receive in the  mail after your visit with us. Thank you!             Your Updated Medication List - Protect others around you: Learn how to safely use, store and throw away your medicines at www.disposemymeds.org.          This list is accurate as of: 12/29/17  9:54 AM.  Always use your most recent med list.                   Brand Name Dispense Instructions for use Diagnosis    buPROPion 150 MG 12 hr tablet    WELLBUTRIN SR    180 tablet    Take 1 tablet once daily and increase to 1 tablet twice daily after 7 days    Tobacco use       cyclobenzaprine 10 MG tablet    FLEXERIL    14 tablet    Take 1 tablet (10 mg) by mouth nightly as needed for muscle spasms    Muscle tightness       predniSONE 20 MG tablet    DELTASONE    20 tablet    Take 3 tabs (60 mg) by mouth daily x 3 days, 2 tabs (40 mg) daily x 3 days, 1 tab (20 mg) daily x 3 days, then 1/2 tab (10 mg) x 4 days.    Cervical radiculopathy, Cervicalgia

## 2017-12-29 NOTE — PATIENT INSTRUCTIONS
Start Zyban/buproprion as soon as you get the prescription. Your wean process should be as follows (however, you can always quit completely at anytime in the next 3 months):    Starting cigarette intake per day: 10    Week 2: decrease your cigarette intake by 25%, down to 8 cigarettes per day.    Week 4: decrease your cigarette intake by 50%, down to 5 cigarettes per day.    Week 6: decrease your cigarette intake by 75%, down to 3 cigarettes per day.    Week 8: decrease your cigarette intake by 90%, down to 1 cigarettes per day.    Week 10: quit smoking altogether!

## 2018-01-04 ENCOUNTER — RADIANT APPOINTMENT (OUTPATIENT)
Dept: MRI IMAGING | Facility: CLINIC | Age: 43
End: 2018-01-04
Attending: PHYSICIAN ASSISTANT
Payer: COMMERCIAL

## 2018-01-04 DIAGNOSIS — M54.2 CERVICALGIA: ICD-10-CM

## 2018-01-04 DIAGNOSIS — M54.12 CERVICAL RADICULOPATHY: ICD-10-CM

## 2018-01-04 PROCEDURE — 72141 MRI NECK SPINE W/O DYE: CPT | Mod: TC

## 2018-01-08 ENCOUNTER — MYC MEDICAL ADVICE (OUTPATIENT)
Dept: FAMILY MEDICINE | Facility: CLINIC | Age: 43
End: 2018-01-08

## 2018-01-08 DIAGNOSIS — E07.9 DISORDER OF THYROID: Primary | ICD-10-CM

## 2018-01-10 ENCOUNTER — TRANSFERRED RECORDS (OUTPATIENT)
Dept: HEALTH INFORMATION MANAGEMENT | Facility: CLINIC | Age: 43
End: 2018-01-10

## 2018-01-21 ENCOUNTER — TRANSFERRED RECORDS (OUTPATIENT)
Dept: HEALTH INFORMATION MANAGEMENT | Facility: CLINIC | Age: 43
End: 2018-01-21

## 2018-10-26 ENCOUNTER — TELEPHONE (OUTPATIENT)
Dept: FAMILY MEDICINE | Facility: CLINIC | Age: 43
End: 2018-10-26

## 2018-10-26 NOTE — TELEPHONE ENCOUNTER
Patient seen in [facility]. Diagnosed with blufferitis. Erythromycin Ointment not helping. Neither are are the hotpacks. Should she see an eye Dr or a family practice? Ok to leave a message.

## 2018-10-26 NOTE — TELEPHONE ENCOUNTER
Blepharitis.   Called patient back. No answer. Left message on voice mail for patient to call clinic. [phone]  Angelica Haji RN, BSN

## 2018-10-29 ENCOUNTER — OFFICE VISIT (OUTPATIENT)
Dept: FAMILY MEDICINE | Facility: CLINIC | Age: 43
End: 2018-10-29
Payer: COMMERCIAL

## 2018-10-29 ENCOUNTER — RADIANT APPOINTMENT (OUTPATIENT)
Dept: GENERAL RADIOLOGY | Facility: CLINIC | Age: 43
End: 2018-10-29
Attending: FAMILY MEDICINE
Payer: COMMERCIAL

## 2018-10-29 VITALS
TEMPERATURE: 98 F | SYSTOLIC BLOOD PRESSURE: 122 MMHG | WEIGHT: 188 LBS | BODY MASS INDEX: 36.72 KG/M2 | DIASTOLIC BLOOD PRESSURE: 81 MMHG | HEART RATE: 76 BPM

## 2018-10-29 DIAGNOSIS — M25.552 HIP PAIN, LEFT: Primary | ICD-10-CM

## 2018-10-29 PROCEDURE — 99214 OFFICE O/P EST MOD 30 MIN: CPT | Performed by: FAMILY MEDICINE

## 2018-10-29 PROCEDURE — 73502 X-RAY EXAM HIP UNI 2-3 VIEWS: CPT | Mod: FY

## 2018-10-29 RX ORDER — CYCLOBENZAPRINE HCL 5 MG
5 TABLET ORAL 3 TIMES DAILY PRN
Qty: 42 TABLET | Refills: 0 | Status: SHIPPED | OUTPATIENT
Start: 2018-10-29 | End: 2019-05-21

## 2018-10-29 RX ORDER — DICLOFENAC SODIUM 75 MG/1
75 TABLET, DELAYED RELEASE ORAL 2 TIMES DAILY PRN
Qty: 60 TABLET | Refills: 0 | Status: SHIPPED | OUTPATIENT
Start: 2018-10-29 | End: 2019-05-21

## 2018-10-29 NOTE — PROGRESS NOTES
SUBJECTIVE:   Lissette England is a 42 year old female who presents to clinic today for the following health issues:      Joint Pain    Onset: x6 days    Description:   Location: left hip   Character: Sharp, ache     Intensity: severe with movement     Progression of Symptoms: worse as of today     Accompanying Signs & Symptoms:  Other symptoms: none    History:   Previous similar pain: no       Precipitating factors:   Trauma or overuse: no     Alleviating factors:  Improved by: nothing    Therapies Tried and outcome: none        Reports a prior history of on and off low back pain but no known history of lumbar radiculopathy.     Problem list and histories reviewed & adjusted, as indicated.  Additional history: as documented    Patient Active Problem List   Diagnosis     CARDIOVASCULAR SCREENING; LDL GOAL LESS THAN 160     No past surgical history on file.    Social History   Substance Use Topics     Smoking status: Current Every Day Smoker     Types: Cigarettes     Smokeless tobacco: Never Used     Alcohol use Yes     Family History   Problem Relation Age of Onset     Hypertension Mother          Current Outpatient Prescriptions   Medication Sig Dispense Refill     cyclobenzaprine (FLEXERIL) 5 MG tablet Take 1 tablet (5 mg) by mouth 3 times daily as needed for muscle spasms 42 tablet 0     diclofenac (VOLTAREN) 75 MG EC tablet Take 1 tablet (75 mg) by mouth 2 times daily as needed for moderate pain (Take with food) 60 tablet 0     buPROPion (WELLBUTRIN SR) 150 MG 12 hr tablet Take 1 tablet once daily and increase to 1 tablet twice daily after 7 days 180 tablet 3     No Known Allergies    Reviewed and updated as needed this visit by clinical staff       Reviewed and updated as needed this visit by Provider         ROS:  All others are negative except as above.      OBJECTIVE:     /81  Pulse 76  Temp 98  F (36.7  C) (Tympanic)  Wt 188 lb (85.3 kg)  Breastfeeding? No  BMI 36.72 kg/m2  Body mass index is  36.72 kg/(m^2).  GENERAL: healthy, alert and no distress  MS: LLE exam shows normal strength and muscle mass, no deformities, no erythema, induration, or nodules, no evidence of joint effusion and ROM of all joints is normal. No tenderness over the left trochanteric bursa, discomfort on internal rotation of the left hip joint.   BACK: no CVA tenderness, no paralumbar tenderness        Diagnostic Test Results:  Xray - in process    ASSESSMENT/PLAN:     Lissette was seen today for hip pain.    Diagnoses and all orders for this visit:    Hip pain, left  -     XR Hip Left 2-3 Views  -     diclofenac (VOLTAREN) 75 MG EC tablet; Take 1 tablet (75 mg) by mouth 2 times daily as needed for moderate pain (Take with food)  -     cyclobenzaprine (FLEXERIL) 5 MG tablet; Take 1 tablet (5 mg) by mouth 3 times daily as needed for muscle spasms    Will notify her with results.      Follow up if symptoms fail to improve or worsen.      The patient was in agreement with the plan today and had no questions or concerns prior to leaving the clinic.      Lakesha Mirza MD  [facility]

## 2018-10-29 NOTE — TELEPHONE ENCOUNTER
Left message on identified (Mobile) voice mail for patient to call clinic. [phone].  Shila Santos RN

## 2018-10-29 NOTE — MR AVS SNAPSHOT
After Visit Summary   10/29/2018    Lissette England    MRN: [id number]           Patient Information     Date Of Birth          1975        Visit Information        Provider Department      10/29/2018 9:00 AM Lakesha Mirza MD [facility]        Today's Diagnoses     Hip pain, left    -  1      Care Instructions    Will notify you with results.           Follow-ups after your visit        Follow-up notes from your care team     Return if symptoms worsen or fail to improve.      Who to contact     Normal or non-critical lab and imaging results will be communicated to you by [employer], letter or phone within 4 business days after the clinic has received the results. If you do not hear from us within 7 days, please contact the clinic through [employer] or phone. If you have a critical or abnormal lab result, we will notify you by phone as soon as possible.  Submit refill requests through [employer] or call your pharmacy and they will forward the refill request to us. Please allow 3 business days for your refill to be completed.          If you need to speak with a  for additional information , please call: [phone]             Additional Information About Your Visit        MyChart Information     [employer] gives you secure access to your electronic health record. If you see a primary care provider, you can also send messages to your care team and make appointments. If you have questions, please call your primary care clinic.  If you do not have a primary care provider, please call [phone] and they will assist you.        Care EveryWhere ID     This is your Care EveryWhere ID. This could be used by other organizations to access your [place] medical records  [id number]        Your Vitals Were     Pulse Temperature Breastfeeding? BMI (Body Mass Index)          76 98  F (36.7  C) (Tympanic) No 36.72 kg/m2         Blood Pressure from Last 3 Encounters:   10/29/18 122/81    12/29/17 116/84   10/30/17 138/87    Weight from Last 3 Encounters:   10/29/18 188 lb (85.3 kg)   12/29/17 188 lb 9.6 oz (85.5 kg)   10/30/17 191 lb 6.4 oz (86.8 kg)              We Performed the Following     XR Hip Left 2-3 Views          Today's Medication Changes          These changes are accurate as of 10/29/18  9:17 AM.  If you have any questions, ask your nurse or doctor.               Start taking these medicines.        Dose/Directions    cyclobenzaprine 5 MG tablet   Commonly known as:  FLEXERIL   Used for:  Hip pain, left        Dose:  5 mg   Take 1 tablet (5 mg) by mouth 3 times daily as needed for muscle spasms   Quantity:  42 tablet   Refills:  0       diclofenac 75 MG EC tablet   Commonly known as:  VOLTAREN   Used for:  Hip pain, left        Dose:  75 mg   Take 1 tablet (75 mg) by mouth 2 times daily as needed for moderate pain (Take with food)   Quantity:  60 tablet   Refills:  0            Where to get your medicines      [facility] [address]  [place], [place] - [address] [facility] AT [facility] [address] [address]  [address] [facility], [place] [address]     Phone:  [phone]     cyclobenzaprine 5 MG tablet    diclofenac 75 MG EC tablet                Primary Care Provider Office Phone # Fax #    Cherie Ackerman [phone]       [address] [address] [facility]  [place] [address]        Equal Access to Services     [facility] AH: Hadii aad ku hadasho Soomaali, waaxda luqadaha, qaybta kaalmada adeegyada, waxay idiin haynicola tellez . So [facility] [phone].    ATENCIÓN: Si antoniola sarah, tiene a castle disposición servicios gratuitos de asistencia lingüística. Llame al [phone].    We comply with applicable federal civil rights laws and [place] laws. We do not discriminate on the basis of race, color, national origin, age, disability, sex, sexual orientation, or gender identity.            Thank you!     Thank you for choosing [facility]  [place]  for your care. Our goal is always to provide you with excellent care. Hearing back from our patients is one way we can continue to improve our services. Please take a few minutes to complete the written survey that you may receive in the mail after your visit with us. Thank you!             Your Updated Medication List - Protect others around you: Learn how to safely use, store and throw away your medicines at www.disposemymeds.org.          This list is accurate as of 10/29/18  9:17 AM.  Always use your most recent med list.                   Brand Name Dispense Instructions for use Diagnosis    buPROPion 150 MG 12 hr tablet    WELLBUTRIN SR    180 tablet    Take 1 tablet once daily and increase to 1 tablet twice daily after 7 days    Tobacco use       cyclobenzaprine 5 MG tablet    FLEXERIL    42 tablet    Take 1 tablet (5 mg) by mouth 3 times daily as needed for muscle spasms    Hip pain, left       diclofenac 75 MG EC tablet    VOLTAREN    60 tablet    Take 1 tablet (75 mg) by mouth 2 times daily as needed for moderate pain (Take with food)    Hip pain, left

## 2018-11-09 ENCOUNTER — OFFICE VISIT (OUTPATIENT)
Dept: FAMILY MEDICINE | Facility: CLINIC | Age: 43
End: 2018-11-09
Payer: COMMERCIAL

## 2018-11-09 VITALS
RESPIRATION RATE: 28 BRPM | HEART RATE: 91 BPM | DIASTOLIC BLOOD PRESSURE: 86 MMHG | WEIGHT: 189 LBS | SYSTOLIC BLOOD PRESSURE: 120 MMHG | TEMPERATURE: 97.5 F | BODY MASS INDEX: 36.91 KG/M2 | OXYGEN SATURATION: 97 %

## 2018-11-09 DIAGNOSIS — L30.9 DERMATITIS: Primary | ICD-10-CM

## 2018-11-09 PROCEDURE — 99213 OFFICE O/P EST LOW 20 MIN: CPT | Performed by: PHYSICIAN ASSISTANT

## 2018-11-09 RX ORDER — ERYTHROMYCIN 5 MG/G
OINTMENT OPHTHALMIC
Refills: 0 | COMMUNITY
Start: 2018-10-15 | End: 2019-05-21

## 2018-11-09 RX ORDER — POLYMYXIN B SULFATE AND TRIMETHOPRIM 1; 10000 MG/ML; [USP'U]/ML
SOLUTION OPHTHALMIC
Refills: 0 | COMMUNITY
Start: 2018-11-01 | End: 2019-05-21

## 2018-11-09 RX ORDER — DESONIDE 0.5 MG/G
OINTMENT TOPICAL
Qty: 15 G | Refills: 0 | Status: SHIPPED | OUTPATIENT
Start: 2018-11-09 | End: 2019-05-21

## 2018-11-09 RX ORDER — PREDNISONE 20 MG/1
TABLET ORAL
Qty: 5 TABLET | Refills: 0 | Status: SHIPPED | OUTPATIENT
Start: 2018-11-09 | End: 2019-05-21

## 2018-11-09 NOTE — MR AVS SNAPSHOT
After Visit Summary   11/9/2018    Lissette England    MRN: [id number]           Patient Information     Date Of Birth          1975        Visit Information        Provider Department      11/9/2018 3:40 PM Charito Hansen PA-C [facility]        Today's Diagnoses     Dermatitis    -  1       Follow-ups after your visit        Follow-up notes from your care team     Return in about 1 week (around 11/16/2018), or if symptoms worsen or fail to improve.      Who to contact     Normal or non-critical lab and imaging results will be communicated to you by Nippohart, letter or phone within 4 business days after the clinic has received the results. If you do not hear from us within 7 days, please contact the clinic through Nippohart or phone. If you have a critical or abnormal lab result, we will notify you by phone as soon as possible.  Submit refill requests through Amplitude or call your pharmacy and they will forward the refill request to us. Please allow 3 business days for your refill to be completed.          If you need to speak with a  for additional information , please call: [phone]             Additional Information About Your Visit        MyChart Information     Amplitude gives you secure access to your electronic health record. If you see a primary care provider, you can also send messages to your care team and make appointments. If you have questions, please call your primary care clinic.  If you do not have a primary care provider, please call [phone] and they will assist you.        Care EveryWhere ID     This is your Care EveryWhere ID. This could be used by other organizations to access your [facility] medical records  [id number]        Your Vitals Were     Pulse Temperature Respirations Pulse Oximetry BMI (Body Mass Index)       91 97.5  F (36.4  C) (Tympanic) 28 97% 36.91 kg/m2        Blood Pressure from Last 3 Encounters:   11/09/18 120/86    10/29/18 122/81   12/29/17 116/84    Weight from Last 3 Encounters:   11/09/18 189 lb (85.7 kg)   10/29/18 188 lb (85.3 kg)   12/29/17 188 lb 9.6 oz (85.5 kg)              Today, you had the following     No orders found for display         Today's Medication Changes          These changes are accurate as of 11/9/18  3:54 PM.  If you have any questions, ask your nurse or doctor.               Start taking these medicines.        Dose/Directions    desonide 0.05 % ointment   Commonly known as:  DESOWEN   Used for:  Dermatitis   Started by:  Charito Hansen PA-C        Apply sparingly to affected area 2-3 times daily for 5-7 days.   Quantity:  15 g   Refills:  0       predniSONE 20 MG tablet   Commonly known as:  DELTASONE   Used for:  Dermatitis   Started by:  Charito Hansen PA-C        Take 1 tab (20 mg) daily x 3 days, then 1/2 tab (10 mg) x 4 days.   Quantity:  5 tablet   Refills:  0            Where to get your medicines      [facility] [address] [place] [address] [place], [place] - [address] [facility] [address]  [address] [facility] [address]     Phone:  [phone]     desonide 0.05 % ointment    predniSONE 20 MG tablet                Primary Care Provider Office Phone # Fax #    Cherie Ackerman [phone]       [address] [facility]  [place] [address]        Equal Access to Services     [facility]: Hadii aad ku hadasho Soomaali, waaxda luqadaha, qaybta kaalmada adeegyada, denis tellez . So [facility] [phone].    ATENCIÓN: Si habla español, tiene a castle disposición servicios gratuitos de asistencia lingüística. Llame al [phone].    We comply with applicable federal civil rights laws and [place] laws. We do not discriminate on the basis of race, color, national origin, age, disability, sex, sexual orientation, or gender identity.            Thank you!     Thank you for choosing [facility]   for your care. Our goal is always to provide you with excellent care. Hearing back from our patients is one way we can continue to improve our services. Please take a few minutes to complete the written survey that you may receive in the mail after your visit with us. Thank you!             Your Updated Medication List - Protect others around you: Learn how to safely use, store and throw away your medicines at www.disposemymeds.org.          This list is accurate as of 11/9/18  3:54 PM.  Always use your most recent med list.                   Brand Name Dispense Instructions for use Diagnosis    buPROPion 150 MG 12 hr tablet    WELLBUTRIN SR    180 tablet    Take 1 tablet once daily and increase to 1 tablet twice daily after 7 days    Tobacco use       cyclobenzaprine 5 MG tablet    FLEXERIL    42 tablet    Take 1 tablet (5 mg) by mouth 3 times daily as needed for muscle spasms    Hip pain, left       desonide 0.05 % ointment    DESOWEN    15 g    Apply sparingly to affected area 2-3 times daily for 5-7 days.    Dermatitis       diclofenac 75 MG EC tablet    VOLTAREN    60 tablet    Take 1 tablet (75 mg) by mouth 2 times daily as needed for moderate pain (Take with food)    Hip pain, left       erythromycin ophthalmic ointment    ROMYCIN     ADMINISTER TO BOTH EYES 4 (FOUR) TIMES A DAY FOR 7 DAYS.        predniSONE 20 MG tablet    DELTASONE    5 tablet    Take 1 tab (20 mg) daily x 3 days, then 1/2 tab (10 mg) x 4 days.    Dermatitis       trimethoprim-polymyxin b ophthalmic solution    POLYTRIM     ADMINISTER 1 DROP TO BOTH EYES EVERY 3 (THREE) HOURS FOR 7 DAYS. DO NOT EXCEED 6 DOSES IN 24 HOURS.

## 2018-11-09 NOTE — PROGRESS NOTES
SUBJECTIVE:   Lissette England is a 43 year old female who presents to clinic today for the following health issues:      ED/UC Followup:    Facility:  [facility]  Date of visit: 11/01/2018  Reason for visit: eyelid infection  Current Status: not any better, eyes itch, swelling, red, dry  Erythromycin- was not working  Trimethoprim-polymyxin- made her eyes red, eyes burn     HPI   Eyelid Infection   The problem is recurrent. This problem has existed for 1 month. This problem occurs constantly. Timing: Tried erythromycin one more time yesterday to see if helped with redness and crusts and made symptoms worse . The left eye is affected. Associated symptoms include blurred vision (swelling and itching ), redness, discharge (minimal ), swelling, itching and tearing. Negative for decreased vision, limited eye movement and foreign body sensation (Dryness ). Treatments tried include OTC medication and Rx medications (Itchy eye drops   Eye lubricant   Has tried allergy drops and allergy pills without relief ). Treatments provided mild relief.          PROBLEMS TO ADD ON...  none  -------------------------------------    Problem list and histories reviewed & adjusted, as indicated.  Additional history: as documented    Patient Active Problem List   Diagnosis     CARDIOVASCULAR SCREENING; LDL GOAL LESS THAN 160     No past surgical history on file.    Social History   Substance Use Topics     Smoking status: Current Every Day Smoker     Types: Cigarettes     Smokeless tobacco: Never Used     Alcohol use Yes     Family History   Problem Relation Age of Onset     Hypertension Mother            Reviewed and updated as needed this visit by clinical staff       Reviewed and updated as needed this visit by Provider         ROS:  Constitutional, eye, skin systems are negative, except as otherwise noted.    OBJECTIVE:                                                    /86  Pulse 91  Temp 97.5  F (36.4  C) (Tympanic)   Resp 28  Wt 189 lb (85.7 kg)  SpO2 97%  BMI 36.91 kg/m2  Body mass index is 36.91 kg/(m^2).  GENERAL APPEARANCE: alert and no distress  EYES: Eyes grossly normal to inspection, conjunctivae and sclerae normal and eyelids- erythematous and edematous upper lids, xerosis   PSYCH: mentation appears normal and affect normal/bright       ASSESSMENT:                                                      1. Dermatitis         PLAN:                                                    Appears eczematous. Desonide and low dose prednisone B&T. Supportive therapy also discussed. Follow up if symptoms fail to improve or worsen.      The patient was in agreement with the plan today and had no questions or concerns prior to leaving the clinic.     Charito Hansen PA-C  [facility]

## 2019-05-21 ENCOUNTER — OFFICE VISIT (OUTPATIENT)
Dept: FAMILY MEDICINE | Facility: CLINIC | Age: 44
End: 2019-05-21
Payer: COMMERCIAL

## 2019-05-21 VITALS
RESPIRATION RATE: 16 BRPM | SYSTOLIC BLOOD PRESSURE: 133 MMHG | HEIGHT: 60 IN | BODY MASS INDEX: 37.3 KG/M2 | OXYGEN SATURATION: 99 % | WEIGHT: 190 LBS | DIASTOLIC BLOOD PRESSURE: 85 MMHG | TEMPERATURE: 98.6 F | HEART RATE: 83 BPM

## 2019-05-21 DIAGNOSIS — Z11.4 SCREENING FOR HIV (HUMAN IMMUNODEFICIENCY VIRUS): ICD-10-CM

## 2019-05-21 DIAGNOSIS — F17.200 TOBACCO USE DISORDER: ICD-10-CM

## 2019-05-21 DIAGNOSIS — Z30.42 DEPO-PROVERA CONTRACEPTIVE STATUS: Primary | ICD-10-CM

## 2019-05-21 LAB — HCG UR QL: NEGATIVE

## 2019-05-21 PROCEDURE — 36415 COLL VENOUS BLD VENIPUNCTURE: CPT | Performed by: NURSE PRACTITIONER

## 2019-05-21 PROCEDURE — 81025 URINE PREGNANCY TEST: CPT | Performed by: NURSE PRACTITIONER

## 2019-05-21 PROCEDURE — 99213 OFFICE O/P EST LOW 20 MIN: CPT | Mod: 25 | Performed by: NURSE PRACTITIONER

## 2019-05-21 PROCEDURE — 96372 THER/PROPH/DIAG INJ SC/IM: CPT | Performed by: NURSE PRACTITIONER

## 2019-05-21 PROCEDURE — 87389 HIV-1 AG W/HIV-1&-2 AB AG IA: CPT | Performed by: NURSE PRACTITIONER

## 2019-05-21 RX ORDER — MEDROXYPROGESTERONE ACETATE 150 MG/ML
150 INJECTION, SUSPENSION INTRAMUSCULAR
Status: DISCONTINUED | OUTPATIENT
Start: 2019-05-21 | End: 2020-05-15

## 2019-05-21 RX ORDER — MEDROXYPROGESTERONE ACETATE 150 MG/ML
150 INJECTION, SUSPENSION INTRAMUSCULAR
Qty: 1 ML | Refills: 3 | OUTPATIENT
Start: 2019-05-21 | End: 2020-05-19

## 2019-05-21 RX ADMIN — MEDROXYPROGESTERONE ACETATE 150 MG: 150 INJECTION, SUSPENSION INTRAMUSCULAR at 16:30

## 2019-05-21 ASSESSMENT — MIFFLIN-ST. JEOR: SCORE: 1435.84

## 2019-05-21 NOTE — PATIENT INSTRUCTIONS
Patient Education     Deep Vein Thrombosis (DVT)     Vein, blood clot, embolus     Deep vein thrombosis (DVT) occurs when a blood clot (thrombus) forms in a deep vein. This happens most often in the leg. It can also happen in the arms or other parts of the body. A part of the clot called an embolus can break off and travel to the lungs. When this happens, it s called a pulmonary embolism (PE). PE is a medical emergency. It can cut off blood flow and lead to death. Both DVT and PE are closely related. Together, they are often referred to by the term venous thromboembolism (VTE).  Risk factors for DVT  Anything that slows blood flow, injures the lining of a vein, or increases blood clotting can make you more prone to having DVT. This includes the following:    Long periods without movement (such as when sitting for many hours at a time or when recovering from major surgery or illness)    Estrogen (female hormone) therapy, such as hormone replacement therapy (HRT) or birth control pills    Fractured hip or leg    Major surgery or joint replacement    Major trauma or spinal cord injury    Cancer    Family history    Excess weight or obesity    Smoking    Older age  Symptoms  DVT does not always cause symptoms. When symptoms do occur, they may appear around the site of the DVT, such as in the leg. Possible symptoms include:    Swelling    Pain    Warmth    Redness    Tenderness  Home care    You were likely prescribed blood thinners (anticoagulants). They may be given as pills (oral) or shots (injections). Follow all instructions when using these medicines. Note: Don't take blood thinners with other medicines, herbal remedies, or supplements without talking to your provider first. Certain medicines or products can affect how blood thinners work.    Follow your provider s instructions about activity and rest.    If support or compression stockings are prescribed, wear them as directed. These may help improve blood flow  in the legs.    When sitting or lying down, move your ankles, toes and knees often. This may also help improve blood flow in the legs.  Follow-up care  Follow up with your healthcare provider, or as advised. If imaging tests were done, they may need further review by a doctor. You will be told of any new findings that may affect your care.  When to seek medical advice  Call your healthcare provider right away if any of these occur:    New or increased swelling, pain, tenderness, warmth, or redness, in the leg, arm, or other area    Blood in the urine    Bleeding with bowel movements  Call 911  Call 911 if any of these occur:    Bleeding from the nose, gums, a cut, or vagina    Heavy or uncontrolled bleeding    Trouble breathing    Chest pain or discomfort that worsens with deep breathing or coughing    Coughing (may cough up blood)    Fast heartbeat    Sweating    Anxiety    Lightheadedness, dizziness, or fainting  Date Last Reviewed: 4/1/2018 2000-2018 [employer]. [address] [place] [address], [place] [address]. All rights reserved. This information is not intended as a substitute for professional medical care. Always follow your healthcare professional's instructions.

## 2019-05-21 NOTE — NURSING NOTE
Prior to injection, verified patient identity using patient's name and date of birth.  Due to injection administration, patient instructed to remain in clinic for 15 minutes  afterwards, and to report any adverse reaction to me immediately.    BP: 133/85    LAST PAP/EXAM:   Lab Results   Component Value Date    PAP NIL 10/30/2017     URINE HCG:negative    NEXT INJECTION DUE: 8/6/19 - 8/20/19       Drug Amount Wasted:  None.  Vial/Syringe: Single dose vial  Expiration Date:  2//20

## 2019-05-21 NOTE — PROGRESS NOTES
"Subjective     Lissette England is a 43 year old female who presents to clinic today for the following health issues:    HPI   Was wondering about essure- not done anymore in [place] for some reason?  Patient would like to discuss depo injection. States periods \"are horrible; had tubal.\"  All forms of birth control discussed as well as risks and benefits of each (DVT -& S&S, PE, MI, stroke, death, increased risk with smoking, breast ca). Patient is a smoker.  Also discussed potential for bone loss/ osteoporosis with depo.    {  Patient Active Problem List   Diagnosis     CARDIOVASCULAR SCREENING; LDL GOAL LESS THAN 160     History reviewed. No pertinent surgical history.    Social History     Tobacco Use     Smoking status: Current Every Day Smoker     Packs/day: 0.50     Types: Cigarettes     Smokeless tobacco: Never Used   Substance Use Topics     Alcohol use: Yes     Comment: occ     Family History   Problem Relation Age of Onset     Hypertension Mother      Cancer Maternal Grandfather         prostate         Current Outpatient Medications   Medication Sig Dispense Refill     medroxyPROGESTERone (DEPO-PROVERA) 150 MG/ML IM injection Inject 1 mL (150 mg) into the muscle every 3 months 1 mL 3     No Known Allergies  Recent Labs   Lab Test 10/30/17  0813   *   HDL 46*   TRIG 129   TSH 1.50      BP Readings from Last 3 Encounters:   05/21/19 133/85   11/09/18 120/86   10/29/18 122/81    Wt Readings from Last 3 Encounters:   05/21/19 86.2 kg (190 lb)   11/09/18 85.7 kg (189 lb)   10/29/18 85.3 kg (188 lb)                    Reviewed and updated as needed this visit by Provider  Tobacco  Allergies  Meds  Problems  Med Hx  Surg Hx  Fam Hx         Review of Systems   ROS COMP: Constitutional, HEENT, cardiovascular, pulmonary, GI, , musculoskeletal, neuro, skin, endocrine and psych systems are negative, except as otherwise noted.      Objective    /85   Pulse 83   Temp 98.6  F (37  C) (Oral)   Resp 16   " "Ht 1.52 m (4' 11.84\")   Wt 86.2 kg (190 lb)   SpO2 99%   BMI 37.30 kg/m    Body mass index is 37.3 kg/m .  Physical Exam   GENERAL: healthy, alert and no distress  RESP: lungs clear to auscultation - no rales, rhonchi or wheezes  CV: regular rate and rhythm, normal S1 S2, no S3 or S4, no murmur, click or rub, no peripheral edema and peripheral pulses strong  PSYCH: mentation appears normal, affect normal/bright    Diagnostic Test Results:  Labs reviewed in Epic  See orders        Assessment & Plan       ICD-10-CM    1. Depo-Provera contraceptive status Z30.42 HCG Qual, Urine ([id number])     medroxyPROGESTERone (DEPO-PROVERA) 150 MG/ML IM injection     medroxyPROGESTERone (DEPO-PROVERA) injection 150 mg     THER/PROPH/DIAG INJ, SC/IM   2. Screening for HIV (human immunodeficiency virus) Z11.4 HIV Antigen Antibody Combo   3. Tobacco use disorder F17.200 TOBACCO CESSATION - FOR HEALTH MAINTENANCE        Tobacco Cessation:   reports that she has been smoking cigarettes.  She has been smoking about 0.50 packs per day. She has never used smokeless tobacco.  Tobacco Cessation Action Plan: Information offered: Patient not interested at this time      BMI:   Estimated body mass index is 37.3 kg/m  as calculated from the following:    Height as of this encounter: 1.52 m (4' 11.84\").    Weight as of this encounter: 86.2 kg (190 lb).   Weight management plan: Discussed healthy diet and exercise guidelines        See Patient Instructions    DU Dawson  [facility]    "

## 2019-05-22 LAB — HIV 1+2 AB+HIV1 P24 AG SERPL QL IA: NONREACTIVE

## 2019-08-06 ENCOUNTER — ALLIED HEALTH/NURSE VISIT (OUTPATIENT)
Dept: NURSING | Facility: CLINIC | Age: 44
End: 2019-08-06
Payer: COMMERCIAL

## 2019-08-06 VITALS — BODY MASS INDEX: 36.95 KG/M2 | WEIGHT: 188.2 LBS

## 2019-08-06 PROCEDURE — 96372 THER/PROPH/DIAG INJ SC/IM: CPT

## 2019-08-06 PROCEDURE — 99207 ZZC NO CHARGE NURSE ONLY: CPT

## 2019-08-06 RX ADMIN — MEDROXYPROGESTERONE ACETATE 150 MG: 150 INJECTION, SUSPENSION INTRAMUSCULAR at 17:52

## 2019-08-06 NOTE — PROGRESS NOTES
Clinic Administered Medication Documentation      Depo Provera Documentation    Prior to injection, verified patient identity using patient's name and date of birth. Medication was administered. Please see MAR and medication order for additional information. Patient instructed to remain in clinic for 15 minutes and report any adverse reaction to staff immediately .    BP: Data Unavailable    LAST PAP/EXAM:   Lab Results   Component Value Date    PAP NIL 10/30/2017     URINE HCG:not indicated    NEXT INJECTION DUE: 10/22/19 - 11/5/19    Was entire vial of medication used? Yes  Vial/Syringe: Single dose vial  Expiration Date:  06/2020    Loree Sanchez, [facility]

## 2019-09-28 ENCOUNTER — HEALTH MAINTENANCE LETTER (OUTPATIENT)
Age: 44
End: 2019-09-28

## 2019-10-31 ENCOUNTER — ALLIED HEALTH/NURSE VISIT (OUTPATIENT)
Dept: NURSING | Facility: CLINIC | Age: 44
End: 2019-10-31
Payer: COMMERCIAL

## 2019-10-31 VITALS — SYSTOLIC BLOOD PRESSURE: 120 MMHG | DIASTOLIC BLOOD PRESSURE: 82 MMHG

## 2019-10-31 DIAGNOSIS — Z30.42 ENCOUNTER FOR SURVEILLANCE OF INJECTABLE CONTRACEPTIVE: Primary | ICD-10-CM

## 2019-10-31 PROCEDURE — 96372 THER/PROPH/DIAG INJ SC/IM: CPT

## 2019-10-31 PROCEDURE — 99207 ZZC NO CHARGE NURSE ONLY: CPT

## 2019-10-31 RX ADMIN — MEDROXYPROGESTERONE ACETATE 150 MG: 150 INJECTION, SUSPENSION INTRAMUSCULAR at 15:25

## 2019-11-09 ENCOUNTER — OFFICE VISIT (OUTPATIENT)
Dept: ORTHOPEDICS | Facility: CLINIC | Age: 44
End: 2019-11-09
Payer: COMMERCIAL

## 2019-11-09 ENCOUNTER — ANCILLARY PROCEDURE (OUTPATIENT)
Dept: GENERAL RADIOLOGY | Facility: CLINIC | Age: 44
End: 2019-11-09
Attending: PHYSICAL MEDICINE & REHABILITATION
Payer: COMMERCIAL

## 2019-11-09 VITALS
SYSTOLIC BLOOD PRESSURE: 116 MMHG | HEART RATE: 75 BPM | DIASTOLIC BLOOD PRESSURE: 80 MMHG | WEIGHT: 182.5 LBS | BODY MASS INDEX: 35.83 KG/M2 | HEIGHT: 60 IN

## 2019-11-09 DIAGNOSIS — R20.2 PARESTHESIA OF RIGHT ARM: ICD-10-CM

## 2019-11-09 DIAGNOSIS — R20.2 PARESTHESIA OF RIGHT ARM: Primary | ICD-10-CM

## 2019-11-09 DIAGNOSIS — M54.2 CERVICALGIA: ICD-10-CM

## 2019-11-09 PROCEDURE — 72040 X-RAY EXAM NECK SPINE 2-3 VW: CPT

## 2019-11-09 PROCEDURE — 99204 OFFICE O/P NEW MOD 45 MIN: CPT | Performed by: PHYSICAL MEDICINE & REHABILITATION

## 2019-11-09 ASSESSMENT — MIFFLIN-ST. JEOR: SCORE: 1396.77
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
## 2019-11-09 NOTE — PATIENT INSTRUCTIONS
-Ice or heat 15-20 minutes as needed (Avoid sleeping on a heating pad or ice)  -Patient's preferred over the counter medications as directed on packaging as needed for pain or soreness.  Please take ibuprofen with food.   -Avoid aggravating activities.    -Follow up as needed if symptoms worsen or do not resolve.  Please call with questions or concerns.
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
## 2020-05-13 ENCOUNTER — VIRTUAL VISIT (OUTPATIENT)
Dept: FAMILY MEDICINE | Facility: OTHER | Age: 45
End: 2020-05-13

## 2020-05-13 NOTE — PROGRESS NOTES
"Date: 2020 12:43:00  Clinician: Wolfgang Hart  Clinician NPI: [id number]  Patient: Lissette England  Patient : 1975  Patient Address: [address] [place] [address] [place] [address]  Patient Phone: [phone]  Visit Protocol: Eczema  Patient Summary:  Lissette is a 44 year old ( : 1975 ) female who initiated a Visit for evaluation of contact dermatitis. When asked the question \"Please sign me up to receive news, health information and promotions from [employer].\", Lissette responded \"No\".   A synchronous visit is necessary because the patient reported the following abnormal symptoms:   Rash located all over body (requires clarification)   Images of her skin condition were uploaded.  Her symptoms started 1-3 days ago. The rash is located all over her body. The rash is red in color.   The affected area has dry skin. It feels itchy, warm to touch, and burning. The symptoms do not interfere with her sleep.   Symptom details   Redness: The redness has not rapidly increased in size.   Denied symptoms include blisters, sores, drainage, scabs, tender to touch, pain, numbness, crusts, flaky skin, and scaly skin. Lissette does not feel feverish.   Treatments or home remedies used to relieve the symptoms as reported by the patient (free text): Started yesterday took an antihistamine to help with the itching, went home and took shower seemed to help. Woke up itching my arm took antihistamine and took shower and only put on deodorant today, but still itchy. I used a new perfume last week and again yesterday and it could be that but not 100%. I have really sensitive skin.   Precipitating events  Just before the symptoms started, Lissette came in contact with new hair or skin care products.   Lissette has not been in close contact with anyone that has similar symptoms. She also did not spend time in a wooded area, swim, travel, or spend time in the sun just before her symptoms started.   Pertinent medical history  Lissette has experienced " this skin condition before. Her current skin condition comes and goes. The last time she experienced this skin condition was more than a year ago.   Lissette has a history of eczema.   Ongoing medical conditions were denied.   Lissette smokes or uses smokeless tobacco.   She denies pregnancy and denies breastfeeding. Her last period was over a month ago.   Additional information as reported by the patient (free text): I had an issue with my eyes around Sept. last year came in and they gave me something, can't remember the name and it worked within a day or two, the swelling, redness and itchiness went down.     MEDICATIONS: No current medications, ALLERGIES: NKDA  Clinician Response:  Dear Lissette,  Based on the information provided, you have contact dermatitis. Contact dermatitis is a condition involving skin inflammation. This occurs after contact with a substance that irritates the skin or causes an allergic skin reaction.  The most common symptom is a red, itchy rash. The skin may also be dry or cracked. Occasionally, blisters develop and form a crust on the surface of the skin after bursting.  Self care  Steps you can take to be as comfortable as possible:     Avoid scratching the rash    Take a lukewarm bath to soothe the skin (adding colloidal oatmeal can help even more)    Apply a moisturizing lotion immediately after bathing and frequently reapply throughout the day    Apply a cool, wet washcloth to your rash for 15 minutes several times a day    Use mild soap and laundry detergent    Choose clothing and bedding made of a breathable material like cotton    Do not use antibiotic creams or ointments unless recommended by a  provider     Also, as your provider, I need you to know that becoming tobacco-free is the most important thing you can do to protect your current and future health.  When to seek care  Please make an appointment to be seen in a clinic or urgent care if any of the following occur:     You develop  new symptoms or your symptoms become worse    Your rash hasn't improved after 14 days    Symptoms are so severe that you are unable to sleep or do regular activities    You have areas of broken skin from scratching    You notice symptoms of a skin infection (e.g. Spreading redness, pain that is not improving, fever, warmth)      Diagnosis: Contact dermatitis  Diagnosis ICD: L25.9  Additional Clinician Notes: Take the full course of steroid - prednisone burst.    Okay to take claritin, zyrtec or allegara - 2 tablets daily.  Okay to add either zantac or pepcid daily to help with delayed histamine blocking to minimize rebound itchy rash.  Avoid triggers.  Synchronous Triage: phone, status: completed, duration: 264 seconds  Prescription: prednisone 20 mg oral tablet 10 tablet, 5 days supply. Take 2 tablets by mouth 1 time per day for 5 days. Refills: 0, Refill as needed: no, Allow substitutions: yes  Pharmacy: [id number] - [phone] - [address] [place] [address]

## 2020-05-19 ENCOUNTER — OFFICE VISIT (OUTPATIENT)
Dept: FAMILY MEDICINE | Facility: CLINIC | Age: 45
End: 2020-05-19
Payer: COMMERCIAL

## 2020-05-19 VITALS
HEIGHT: 59 IN | SYSTOLIC BLOOD PRESSURE: 124 MMHG | RESPIRATION RATE: 14 BRPM | HEART RATE: 77 BPM | OXYGEN SATURATION: 98 % | TEMPERATURE: 98.5 F | BODY MASS INDEX: 37.9 KG/M2 | WEIGHT: 188 LBS | DIASTOLIC BLOOD PRESSURE: 82 MMHG

## 2020-05-19 DIAGNOSIS — L29.9 ITCHING: Primary | ICD-10-CM

## 2020-05-19 DIAGNOSIS — R21 RASH AND NONSPECIFIC SKIN ERUPTION: ICD-10-CM

## 2020-05-19 LAB
ALBUMIN SERPL-MCNC: 4.1 G/DL (ref 3.4–5)
ALP SERPL-CCNC: 89 U/L (ref 40–150)
ALT SERPL W P-5'-P-CCNC: 23 U/L (ref 0–50)
ANION GAP SERPL CALCULATED.3IONS-SCNC: 8 MMOL/L (ref 3–14)
AST SERPL W P-5'-P-CCNC: 16 U/L (ref 0–45)
BILIRUB SERPL-MCNC: 0.3 MG/DL (ref 0.2–1.3)
BUN SERPL-MCNC: 12 MG/DL (ref 7–30)
CALCIUM SERPL-MCNC: 8.8 MG/DL (ref 8.5–10.1)
CHLORIDE SERPL-SCNC: 103 MMOL/L (ref 94–109)
CO2 SERPL-SCNC: 26 MMOL/L (ref 20–32)
CREAT SERPL-MCNC: 0.75 MG/DL (ref 0.52–1.04)
DIFFERENTIAL METHOD BLD: ABNORMAL
EOSINOPHIL # BLD AUTO: 0.2 10E9/L (ref 0–0.7)
EOSINOPHIL NFR BLD AUTO: 1 %
ERYTHROCYTE [DISTWIDTH] IN BLOOD BY AUTOMATED COUNT: 14.1 % (ref 10–15)
GFR SERPL CREATININE-BSD FRML MDRD: >90 ML/MIN/{1.73_M2}
GLUCOSE SERPL-MCNC: 88 MG/DL (ref 70–99)
HCT VFR BLD AUTO: 45.4 % (ref 35–47)
HGB BLD-MCNC: 15.1 G/DL (ref 11.7–15.7)
LYMPHOCYTES # BLD AUTO: 3.8 10E9/L (ref 0.8–5.3)
LYMPHOCYTES NFR BLD AUTO: 25 %
MCH RBC QN AUTO: 30.6 PG (ref 26.5–33)
MCHC RBC AUTO-ENTMCNC: 33.3 G/DL (ref 31.5–36.5)
MCV RBC AUTO: 92 FL (ref 78–100)
MONOCYTES # BLD AUTO: 0.8 10E9/L (ref 0–1.3)
MONOCYTES NFR BLD AUTO: 5 %
NEUTROPHILS # BLD AUTO: 10.2 10E9/L (ref 1.6–8.3)
NEUTROPHILS NFR BLD AUTO: 69 %
PLATELET # BLD AUTO: 223 10E9/L (ref 150–450)
PLATELET # BLD EST: ABNORMAL 10*3/UL
POTASSIUM SERPL-SCNC: 4.2 MMOL/L (ref 3.4–5.3)
PROT SERPL-MCNC: 7.4 G/DL (ref 6.8–8.8)
RBC # BLD AUTO: 4.93 10E12/L (ref 3.8–5.2)
RBC MORPH BLD: NORMAL
SODIUM SERPL-SCNC: 137 MMOL/L (ref 133–144)
TSH SERPL DL<=0.005 MIU/L-ACNC: 1.58 MU/L (ref 0.4–4)
VARIANT LYMPHS BLD QL SMEAR: PRESENT
WBC # BLD AUTO: 15 10E9/L (ref 4–11)

## 2020-05-19 PROCEDURE — 36415 COLL VENOUS BLD VENIPUNCTURE: CPT | Performed by: PHYSICIAN ASSISTANT

## 2020-05-19 PROCEDURE — 99214 OFFICE O/P EST MOD 30 MIN: CPT | Performed by: PHYSICIAN ASSISTANT

## 2020-05-19 PROCEDURE — 80050 GENERAL HEALTH PANEL: CPT | Performed by: PHYSICIAN ASSISTANT

## 2020-05-19 RX ORDER — FAMOTIDINE 10 MG
10 TABLET ORAL 2 TIMES DAILY
Qty: 60 TABLET | Refills: 0 | Status: SHIPPED | OUTPATIENT
Start: 2020-05-19 | End: 2021-08-31

## 2020-05-19 ASSESSMENT — MIFFLIN-ST. JEOR: SCORE: 1408.39

## 2020-05-19 NOTE — PROGRESS NOTES
"Subjective     Lissette England is a 44 year old female who presents to clinic today for the following health issues:    HPI       F/U [facility] sx?    Rash  Onset: x 1 week    Description:   Location: All over the body per pt  Character: round, raised, red  Itching (Pruritis): YES    Progression of Symptoms:  same    Accompanying Signs & Symptoms:  Fever: no   Body aches or joint pain: no   Sore throat symptoms: no   Recent cold symptoms: no     History:   Previous similar rash: YES    Precipitating factors:   Exposure to similar rash: YES- possible unsure per pt with new perfume  New exposures: new perfume   Recent travel: [place] 2 months ago    Therapies Tried and outcome: OTC and antihistamine    Did virtual visit on 5-13. Was told she had contact dermatitis and given instructions for antihistamines 2 tabs daily and pepcid. Also given 5 days prednisone.   Used a new perfume before this started. Does have a h/o eczema also per patient. No pustules or vesicles. No fevers.     No cough or cold symptoms. \"feels like sunburn\".   Itching started FIrst THEN rash/redness to skin. Lots of excoriation present on exam.   Started on her neck. Has on trunk and hands and some on legs. None in groin, less likely scabies. No tracts inbetween fingers either.   Stopped prednisone on Sunday. Did not help at all per patient. Has been taking benadryl at night only. Can't tolerate calamine or anything including lotion on her body per patient.   H/o thyroid nodules per patient. No recent labs on patient.     She feels strong this is not from her perfume she used.       No one at home has the rash.    Patient Active Problem List   Diagnosis     CARDIOVASCULAR SCREENING; LDL GOAL LESS THAN 160     Past Surgical History:   Procedure Laterality Date     NO HISTORY OF SURGERY         Social History     Tobacco Use     Smoking status: Current Every Day Smoker     Packs/day: 0.50     Years: 15.00     Pack years: 7.50     Types: Cigarettes     " "Smokeless tobacco: Never Used   Substance Use Topics     Alcohol use: Yes     Comment: occ     Family History   Problem Relation Age of Onset     Hypertension Mother      Cancer Maternal Grandfather         prostate         Current Outpatient Medications   Medication Sig Dispense Refill     famotidine (PEPCID) 10 MG tablet Take 1 tablet (10 mg) by mouth 2 times daily 60 tablet 0     No Known Allergies      Reviewed and updated as needed this visit by Provider         Review of Systems   Constitutional, HEENT, cardiovascular, pulmonary, GI, , musculoskeletal, neuro, skin, endocrine and psych systems are negative, except as otherwise noted.      Objective    /82   Pulse 77   Temp 98.5  F (36.9  C) (Tympanic)   Resp 14   Ht 1.499 m (4' 11\")   Wt 85.3 kg (188 lb)   SpO2 98%   Breastfeeding No   BMI 37.97 kg/m    Body mass index is 37.97 kg/m .  Physical Exam   GENERAL: alert, no distress and obese  NECK: no adenopathy, no asymmetry, masses, or scars and thyroid normal to palpation  RESP: lungs clear to auscultation - no rales, rhonchi or wheezes  CV: regular rate and rhythm, normal S1 S2, no S3 or S4, no murmur, click or rub, no peripheral edema and peripheral pulses strong  MS: no gross musculoskeletal defects noted, no edema  SKIN: :  Diffuse excoriation and erythema/rritiation on areas she can reach (arms, neck) . No papules, vesicles, or warmth/edema. Skin is very very dry especially upper arms and hands. No tracts to suggest scabies but it is possible. No specific rash seen.   NEURO: Normal strength and tone, mentation intact and speech normal  PSYCH: mentation appears normal, affect normal/bright            Assessment & Plan     1. Itching  Unsure etiology, she has had more stress lately, could be perfume, viral, scabies, allergy, or other  See below    - DERMATOLOGY REFERRAL  - famotidine (PEPCID) 10 MG tablet; Take 1 tablet (10 mg) by mouth 2 times daily  Dispense: 60 tablet; Refill: 0  - CBC " with platelets and differential  - Comprehensive metabolic panel  - TSH with free T4 reflex    2. Rash and nonspecific skin eruption    - DERMATOLOGY REFERRAL      Patient Instructions   Stop benadryl  Change to zyrtec over the counter twice daily  Add pepcid twice daily (sent to pharmacy) also is over the counter if not covered by insurance  F/u if symptoms worsen or change  Try oatmeal baths  Try your very best to not itch as this makes it worse  For dry skin-vanicream would be a good cream to try if you are able to tolerate a lotion. This helps the barrier of your skin and can help with itching/soothe the skin. It is recommended for people with sensitive skin by dermatologists  Schedule with dermatology for follow up   I will follow up with labs                  Return in about 2 weeks (around 6/2/2020) for if not improving.    Jennifer Suggs PA-C  [facility]

## 2020-05-19 NOTE — PATIENT INSTRUCTIONS
Stop benadryl  Change to zyrtec over the counter twice daily  Add pepcid twice daily (sent to pharmacy) also is over the counter if not covered by insurance  F/u if symptoms worsen or change  Try oatmeal baths  Try your very best to not itch as this makes it worse  For dry skin-vanicream would be a good cream to try if you are able to tolerate a lotion. This helps the barrier of your skin and can help with itching/soothe the skin. It is recommended for people with sensitive skin by dermatologists  Schedule with dermatology for follow up   I will follow up with labs

## 2020-09-27 ENCOUNTER — VIRTUAL VISIT (OUTPATIENT)
Dept: FAMILY MEDICINE | Facility: OTHER | Age: 45
End: 2020-09-27
Payer: COMMERCIAL

## 2020-09-27 PROCEDURE — 99421 OL DIG E/M SVC 5-10 MIN: CPT | Performed by: PHYSICIAN ASSISTANT

## 2020-09-27 NOTE — PROGRESS NOTES
"Date: 2020 10:22:30  Clinician: Ozzie Gutierrez  Clinician NPI: [id number]  Patient: Lissette England  Patient : 1975  Patient Address: [address] [place] [address] [place] [address]  Patient Phone: [phone]  Visit Protocol: Eye conditions  Patient Summary:  Lissette is a 44 year old (: 1975 ) female who initiated a [facility] Visit for conjunctivitis.  When asked the question \"Please sign me up to receive news, health information and promotions from [facility].\", Lissette responded \"Yes\".    Images of her eye condition were uploaded.   Her symptoms started 1-2 days ago and affect both eyes. The symptoms consist of eyelid swelling, drainage coming from the eye(s), eye redness, and itchy eye(s). Additionally, her vision has become more blurry since her symptoms started, but it's possible the blurry vision is caused by the eyelid swelling and/or drainage coming from the eye(s).   Symptom details     Drainage: The color of the drainage coming out of her eye(s) is white. The drainage is thick and causes her eyelids to be stuck shut in the morning.    Itchiness: Lissette does not have seasonal allergies or hay fever.     Denied symptoms include bumps on the eyelid, light sensitivity, and eye pain. Lissette does not have subconjunctival hemorrhage. She does not feel feverish.   Precipitating events   Lissette has not had a recent diagnosis of conjunctivitis. She also has not had a recent eye injury, foreign body in the eye(s), cold or ear infection, and eye surgery. It is not known if Lissette has recently been exposed to someone with a red eye or an eye infection. She does not wear contact lenses.   Pertinent medical history  Lissette has not ever been diagnosed with glaucoma.   Lissette has been using medication to treat her current symptoms. Medication used to treat current symptoms as reported by the patient (free text): eye lubricating drops   Medication efficacy as reported by the patient (free text): Not at all, gives relief for a " carlos Mclaughlin does not require proof of evaluation of her eye condition before returning to school, work, or .   Lissette smokes or uses smokeless tobacco.   She denies pregnancy and denies breastfeeding. Her last period was over a month ago.     MEDICATIONS: No current medications, ALLERGIES: NKDA  Clinician Response:  Dear Lissette,  Based on the information provided, you most likely have bacterial conjunctivitis, more commonly called pink eye.  Medication information  I am prescribing:  Polymyxin B sulf-trimethoprim (Polytrim) 10,000 unit- 1 mg/mL ophthalmic (eye) drops. Apply 1 drop into the affected eye(s) every 3 hours, up to 6 times a day for 7 days. There are no refills with this prescription.  The medication I prescribed is an antibiotic medication. Infections can be caused by either bacteria or a virus, and often have similar symptoms, so it is possible that this is a viral infection. Antibiotics are only effective against bacterial infections, so when it is caused by a virus, the medication will not help symptoms improve or make it less contagious.  Self care  To reduce the spread of the eye infection, you should not use eye makeup until the infection has fully resolved, and be sure to wash your hands at least once per hour and avoid touching the eyes as much as possible.  The following will reduce the risk for future eye infections:     Frequent handwashing    Replace towels and washcloths daily    Do not share towels and washcloths with others    Replace eye makeup used while eyes were infected    Do not use anyone else's eye makeup     Steps you can take to be as comfortable as possible:     Avoid rubbing your eyes    Apply a cool compress to the eye(s)    Take regular breaks and remember to blink regularly when reading or using a computer for long periods of time    Wear sunglasses when outside    Wear eye protection when swimming or working with chemicals    Use good lighting     Becoming tobacco-free  is one of the best things you can do to improve your health. For help with quitting tobacco, you can call 7-349-QUIT-NOW (1-496.469.1488) or visit smokefree.gov.  When to seek care  Please make an appointment to be seen in a clinic or urgent care if any of the following occurs:     You develop new symptoms or your symptoms becomes worse.    Your symptoms do not improve within 2 days of starting treatment.      Diagnosis: Bacterial conjunctivitis  Diagnosis ICD: H10.9  Prescription: polymyxin B sulf-trimethoprim (Polytrim) 10,000 unit- 1 mg/mL ophthalmic (eye) drops 1 10 ml dropper bottle, 7 days supply. Apply 1 drop into the affected eye(s) every 3 hours up to 6 times a day for 7 days. Refills: 0, Refill as needed: no, Allow substitutions: yes  Pharmacy: [facility] #[id number] - [phone] - [address] [place] [address]

## 2020-09-29 NOTE — RESULT ENCOUNTER NOTE
Tammy Mclaughlin,       Your recent test results are attached, if you have any questions or concerns please feel free to contact me via e-mail or call [phone]. Labs are as expected. Due to recent prednisone, your WBC and neutrophils are elevated this is a side effect of medication and will return to normal over the next two weeks.no need to repeat the cbc unless you want to. thryoid normal. Liver and kidney function normal. Nothing concerning seen . Platelets normal.        It was a pleasure to see you at your recent office visit.      Sincerely,  Jennifer Suggs PA-C    
Never

## 2020-10-01 ENCOUNTER — OFFICE VISIT (OUTPATIENT)
Dept: FAMILY MEDICINE | Facility: CLINIC | Age: 45
End: 2020-10-01
Payer: COMMERCIAL

## 2020-10-01 VITALS
BODY MASS INDEX: 38.13 KG/M2 | TEMPERATURE: 97.6 F | DIASTOLIC BLOOD PRESSURE: 74 MMHG | SYSTOLIC BLOOD PRESSURE: 115 MMHG | RESPIRATION RATE: 16 BRPM | WEIGHT: 188.8 LBS | HEART RATE: 68 BPM | OXYGEN SATURATION: 99 %

## 2020-10-01 DIAGNOSIS — H10.30 ACUTE CONJUNCTIVITIS, UNSPECIFIED ACUTE CONJUNCTIVITIS TYPE, UNSPECIFIED LATERALITY: Primary | ICD-10-CM

## 2020-10-01 PROCEDURE — 90472 IMMUNIZATION ADMIN EACH ADD: CPT | Performed by: PHYSICIAN ASSISTANT

## 2020-10-01 PROCEDURE — 90686 IIV4 VACC NO PRSV 0.5 ML IM: CPT | Performed by: PHYSICIAN ASSISTANT

## 2020-10-01 PROCEDURE — 90471 IMMUNIZATION ADMIN: CPT | Performed by: PHYSICIAN ASSISTANT

## 2020-10-01 PROCEDURE — 90670 PCV13 VACCINE IM: CPT | Mod: 25 | Performed by: PHYSICIAN ASSISTANT

## 2020-10-01 PROCEDURE — 99213 OFFICE O/P EST LOW 20 MIN: CPT | Mod: 25 | Performed by: PHYSICIAN ASSISTANT

## 2020-10-01 RX ORDER — OFLOXACIN 3 MG/ML
1-2 SOLUTION/ DROPS OPHTHALMIC
Qty: 9 ML | Refills: 0 | Status: SHIPPED | OUTPATIENT
Start: 2020-10-01 | End: 2020-10-11

## 2020-10-01 NOTE — PATIENT INSTRUCTIONS
Flores Mclaughlin,    Thank you for allowing [facility] to manage your care.    Your symptoms are most consistent with allergic conjunctivitis vs a viral cause. See your eye doctor or go to the ER at any time if your vision changes.    Take 10mg of cetirizine (Zyrtec) daily for 10 days.    I sent your prescriptions to your pharmacy.    If you have any questions or concerns, please feel free to call us at [phone]    Sincerely,    Jaspal Tuckre PA-C    Did you know?  You can schedule an e-Visit for certain simple non-emergent issue for your convenience.  To learn more about or start an eVisit, simply login to [employer], click  Visits  on top banner, click  Start a Virtual Visit  drop down, and click  Symptom-Specific E-Visit     Patient Education     Conjunctivitis, Nonspecific    The membrane that covers the white part of your eye (the conjunctiva) is inflamed. Inflammation happens when your body responds to an injury, allergic reaction, infection, or illness. Symptoms of inflammation in the eye may include redness, irritation, itching, swelling, or burning. These symptoms should go away within the next 24 hours. Conjunctivitis may be related to a particle that was in your eye. If so, it may wash out with your tears or irrigation treatment. Being exposed to liquid chemicals or fumes may also cause this reaction.   Home care    Apply a cold pack over the eye for 20 minutes at a time. This will reduce pain. To make a cold pack, put ice cubes in a plastic bag that seals at the top. Wrap the bag in a clean, thin towel or cloth.    Artificial tears may be prescribed to reduce irritation or redness.  These should be used 3 to 4 times a day.    You may use acetaminophen or ibuprofen to control pain, unless another medicine was prescribed. (Note: If you have chronic liver or kidney disease, or if you have ever had a stomach ulcer or gastrointestinal bleeding, talk with your healthcare provider before using these  medicines.)  Follow-up care  Follow up with your healthcare provider, or as advised.  When to seek medical advice  Call your healthcare provider right away if any of these occur:    Increased eyelid swelling    Increased eye pain    Increased redness or drainage from the eye    Increased blurry vision or increased sensitivity to light    Failure of normal vision to return within 24 to 48 hours  Date Last Reviewed: 7/1/2017 2000-2019 [employer]. [address] [place] [address]. All rights reserved. This information is not intended as a substitute for professional medical care. Always follow your healthcare professional's instructions.

## 2020-10-01 NOTE — PROGRESS NOTES
Subjective     Lissette England is a 44 year old female who presents to clinic today for the following health issues:    HPI         Eye(s) Problem  Onset/Duration: 6 days  Description:   Location: both eyes  Pain: no  Redness: YES  Accompanying Signs & Symptoms:  Discharge/mattering: YES  Swelling: YES  Visual changes: no  Fever: no  Nasal Congestion: no  Bothered by bright lights: no  History:  Trauma: no  Foreign body exposure: no  Wearing contacts: no  Precipitating or alleviating factors: None  Therapies tried and outcome: drops prescribed by doctor    No ear pain, runny nose, cough, fevers, chills,     No contacts.    Review of Systems   Constitutional, HEENT, cardiovascular, pulmonary, gi and gu systems are negative, except as otherwise noted.      Objective    There were no vitals taken for this visit.  There is no height or weight on file to calculate BMI.  Physical Exam  Vitals signs and nursing note reviewed.   Constitutional:       General: She is not in acute distress.     Appearance: Normal appearance. She is not diaphoretic.   HENT:      Head: Normocephalic and atraumatic.      Nose: Nose normal.   Eyes:      General: No scleral icterus.     Extraocular Movements: Extraocular movements intact.      Conjunctiva/sclera: Conjunctivae normal.      Pupils: Pupils are equal, round, and reactive to light.      Comments: VA: L-20/20, R-20/20, B-20/16.  Mild mattering bilaterally.  No edema, erythema, rashes or sores to the lids.   Pulmonary:      Effort: Pulmonary effort is normal. No respiratory distress.   Skin:     General: Skin is warm and dry.      Coloration: Skin is not jaundiced or pale.   Neurological:      General: No focal deficit present.      Mental Status: She is alert. Mental status is at baseline.   Psychiatric:         Mood and Affect: Mood normal.         Behavior: Behavior normal.          Assessment & Plan   Problem List Items Addressed This Visit     None      Visit Diagnoses     Acute  conjunctivitis, unspecified acute conjunctivitis type, unspecified laterality    -  Primary    Relevant Medications    ofloxacin (OCUFLOX) 0.3 % ophthalmic solution    Other Relevant Orders    EYE EXAM (SIMPLE-NONBILLABLE) (Completed)         Impression is eye discomfort likely from allergic conjunctivitis, less likely viral or bacterial. No signs of periorbital/orbital edema, zoster ophthalmicus, endophthalmitis or other worrisome process. No visual symptoms. Will send home with cetirizine and a switch from Polytrim to ofloxacin. Follow up with eye doctor next week if not improving or to ER if worsening.    Complete history and physical exam as above. AF with normal VS.    DDx and Dx discussed with and explained to the pt to their satisfaction.  All questions were answered at this time. Pt expressed understanding of and agreement with this dx, tx, and plan. No further workup warranted and standard medication warnings given. I have given the patient a list of pertinent indications for re-evaluation. Will go to the Emergency Department if symptoms worsen or new concerning symptoms arise. Patient left in no apparent distress.     Tobacco Cessation:   reports that she has been smoking cigarettes. She has a 7.50 pack-year smoking history. She has never used smokeless tobacco.  Tobacco Cessation Action Plan: Information offered: Patient not interested at this time    See Patient Instructions  Return if symptoms worsen or fail to improve.    DARIELA Topete  [facility]

## 2020-12-08 ENCOUNTER — NURSE TRIAGE (OUTPATIENT)
Dept: NURSING | Facility: CLINIC | Age: 45
End: 2020-12-08

## 2020-12-08 ENCOUNTER — VIRTUAL VISIT (OUTPATIENT)
Dept: FAMILY MEDICINE | Facility: OTHER | Age: 45
End: 2020-12-08

## 2020-12-08 ENCOUNTER — VIRTUAL VISIT (OUTPATIENT)
Dept: FAMILY MEDICINE | Facility: CLINIC | Age: 45
End: 2020-12-08
Payer: COMMERCIAL

## 2020-12-08 DIAGNOSIS — Z53.9 NO SHOW: Primary | ICD-10-CM

## 2020-12-08 DIAGNOSIS — H57.9 EYE SYMPTOMS: Primary | ICD-10-CM

## 2020-12-08 PROCEDURE — 99213 OFFICE O/P EST LOW 20 MIN: CPT | Mod: 95 | Performed by: PHYSICIAN ASSISTANT

## 2020-12-08 NOTE — PROGRESS NOTES
"Lissette England is a 45 year old female who is being evaluated via a billable telephone visit.      The patient has been notified of following:     \"This telephone visit will be conducted via a call between you and your physician/provider. We have found that certain health care needs can be provided without the need for a physical exam.  This service lets us provide the care you need with a short phone conversation.  If a prescription is necessary we can send it directly to your pharmacy.  If lab work is needed we can place an order for that and you can then stop by our lab to have the test done at a later time.    Telephone visits are billed at different rates depending on your insurance coverage. During this emergency period, for some insurers they may be billed the same as an in-person visit.  Please reach out to your insurance provider with any questions.    If during the course of the call the physician/provider feels a telephone visit is not appropriate, you will not be charged for this service.\"    Patient has given verbal consent for Telephone visit?  Yes    What phone number would you like to be contacted at?     How would you like to obtain your AVS? Teresitahart    Subjective     Lissette England is a 45 year old female who presents via phone visit today for the following health issues:    HPI   complains of eye itching and bilateral eye drainage. \"I'm a redhead so really keen not to try new things - nothing new in my regimen\".  When I do wear mascara this comes about.  Months where not where makeup and still happens.  Recurring symptoms over several months.  Uses same brand mascara.  Resembles pink eye but eyes are not truly red.  Goop in AM - yellowish- have to pry my eyes open.  [facility]- diagnosed with dermis on eyelids and then went to regular doctor  Was given Cream for eyelids -has been treated with couple different antibiotic eye drops without improvement in symptoms " "  Went to regular doctor and prednisone and that worked great   This is a lot of discharge this time but not as much itchiness.   Eyedrop \" Doesn't change things\"  pepcid helps subside for awhile but always comes back  Not a big medicine taker.    Sees regular eye doctor but has never gone in when she has had an acute flare up   Antibiotic eye drop didn't do anything   No vision changes. Does wear glasses and bifocals  Has Never seen allergist   Sneezing lately has been more.  After shower sinuses open up and have sneeze attack- 10 sneezes.  During day and thought it was the mask.  Sneeze attack during the day.  Works as a  - working in school but very limited contact with others.    Lives in [place]                Review of Systems   Constitutional, HEENT, cardiovascular, pulmonary, gi and gu systems are negative, except as otherwise noted.       Objective          Vitals:  No vitals were obtained today due to virtual visit.    healthy, alert and no distress  PSYCH: Alert and oriented times 3; coherent speech, normal   rate and volume, able to articulate logical thoughts, able   to abstract reason, no tangential thoughts, no hallucinations   or delusions  Her affect is normal and pleasant  RESP: No cough, no audible wheezing, able to talk in full sentences  Remainder of exam unable to be completed due to telephone visits            Assessment/Plan:    Assessment & Plan     Eye symptoms  Eye itching and drainage.  Has been treated with antibiotic eye drops couple times without improvement.  Recurrent symptoms   At one time provider thought more likely allergic but recurrent symptoms   Recommended trial of over the counter patanol eye drops and follow up with opthamologist at [facility] as soon as possible.                 Patient Instructions   Try over the counter patanol eye drops for your symptoms     Please schedule follow up with opthamologist at [facility] at [phone] at your " earliest convenience to further evaluate             No follow-ups on file.    Frances Kahn PA-C  [facility]    Phone call duration:  16 minutes

## 2020-12-08 NOTE — PROGRESS NOTES
"Date: 2020 10:08:21  Clinician: Christopher Johnson  Clinician NPI: [id number]  Patient: Lissette England  Patient : 1975  Patient Address: [address] [place] [address] [place] [address]  Patient Phone: [phone]  Visit Protocol: Eye conditions  Patient Summary:  Lissette is a 45 year old (: 1975 ) female who initiated a [facility] Visit for conjunctivitis.  When asked the question \"Please sign me up to receive news, health information and promotions from [facility].\", Lissette responded \"Yes\".    Images of her eye condition were uploaded.   Her symptoms started more than 2 weeks ago and affect both eyes. The symptoms consist of itchy eye(s), eye pain, eyelid swelling, and drainage coming from the eye(s).   Symptom details     Drainage: The color of the drainage coming out of her eye(s) is yellow. The drainage is thick and causes her eyelids to be stuck shut in the morning.    Itchiness: Lissette does not have seasonal allergies or hay fever.    Eye pain: She is experiencing mild eye pain (1-3 on a 10 point pain scale). She has not taken over-the-counter medication for the pain.     Denied symptoms include bumps on the eyelid, light sensitivity, and eye redness. Lissette does not have subconjunctival hemorrhage. She does not feel feverish. Visual acuity was unable to be evaluated.   Precipitating events   She has not had a recent eye surgery, eye injury, foreign body in the eye(s), and cold or ear infection. She does not wear contact lenses.   Pertinent medical history  Lissette has not ever been diagnosed with glaucoma.   Lissette is not taking medication to treat her current symptoms.   Lissette does not have diabetes.   Lissette does not require proof of evaluation of her eye condition before returning to school, work, or .   Lissette does not smoke or use smokeless tobacco.   She denies pregnancy and denies breastfeeding. She has menstruated in the past month.   Additional information as reported by the patient (free text): Please " look at the chart of what they have done in the past, this has been going on and off for a few months now. I would prefer to try an antibiotic, since other treatments have not worked.     MEDICATIONS: No current medications, ALLERGIES: NKDA  Clinician Response:  Dear Lissette,  Based on the information provided, you most likely have bacterial conjunctivitis, more commonly called pink eye.  Medication information  I am prescribing:  Erythromycin 5 mg/gram (0.5%) ophthalmic (eye) ointment. Apply 1 cm ribbon into the affected eye(s) 4-6 times per day for 7 days. There are no refills with this prescription.  The medication I prescribed is an antibiotic medication. Infections can be caused by either bacteria or a virus, and often have similar symptoms, so it is possible that this is a viral infection. Antibiotics are only effective against bacterial infections, so when it is caused by a virus, the medication will not help symptoms improve or make it less contagious.  Self care  To reduce the spread of the eye infection, you should not use eye makeup until the infection has fully resolved, and be sure to wash your hands at least once per hour and avoid touching the eyes as much as possible.  The following will reduce the risk for future eye infections:     Frequent handwashing    Replace towels and washcloths daily    Do not share towels and washcloths with others    Replace eye makeup used while eyes were infected    Do not use anyone else's eye makeup     Steps you can take to be as comfortable as possible:     Take regular breaks and remember to blink regularly when reading or using a computer for long periods of time    Wear sunglasses when outside    Wear eye protection when swimming or working with chemicals    Use good lighting     When to seek care  Please make an appointment to be seen in a clinic or urgent care if any of the following occurs:     You develop new symptoms or your symptoms becomes worse.    Your  symptoms do not improve within 2 days of starting treatment.      Diagnosis: Bacterial conjunctivitis  Diagnosis ICD: H10.9  Prescription: erythromycin 5 mg/gram (0.5 %) ophthalmic (eye) ointment 1 3.5 gram tube, 7 days supply. Apply 1 cm ribbon into the affected eye(s) 4-6 times per day for 7 days. Refills: 0, Refill as needed: no, Allow substitutions: yes

## 2020-12-08 NOTE — TELEPHONE ENCOUNTER
Aurea woo she was seen today virtually by a provider on [facility] who prescribed erythromycin eye ointment for her ongoing eye issue.  States that she had used this in the past with no improvement .  States she wants something different ordered.  Caller was advised to call [facility] help line to discuss this issue-caller declined this.  Caller agreed to have another telephone or virtual visit with a different provider at the clinic, caller has no primary care provider  Mary Ann Alegria RN    Additional Information    Negative: Drug overdose and triager unable to answer question    Negative: Caller requesting information unrelated to medicine    Negative: Caller requesting a prescription for Strep throat and has a positive culture result    Negative: Rash while taking a medication or within 3 days of stopping it    Negative: Immunization reaction suspected    Negative: Asthma and having symptoms of asthma (cough, wheezing, etc.)    Negative: Breastfeeding questions about mother's medicines and diet    Negative: MORE THAN A DOUBLE DOSE of a prescription or over-the-counter (OTC) drug    Negative: DOUBLE DOSE (an extra dose or lesser amount) of over-the-counter (OTC) drug and any symptoms (e.g., dizziness, nausea, pain, sleepiness)    Negative: DOUBLE DOSE (an extra dose or lesser amount) of prescription drug and any symptoms (e.g., dizziness, nausea, pain, sleepiness)    Negative: Took another person's prescription drug    Negative: DOUBLE DOSE (an extra dose or lesser amount) of prescription drug and NO symptoms (Exception: a double dose of antibiotics)    Negative: Diabetes drug error or overdose (e.g., took wrong type of insulin or took extra dose)    Negative: Caller has medication question about med not prescribed by PCP and triager unable to answer question (e.g., compatibility with other med, storage)    Negative: Request for URGENT new prescription or refill of 'essential' medication (i.e., likelihood of harm to  patient if not taken) and triager unable to fill per department policy    Negative: Prescription not at pharmacy and was prescribed today by PCP    Negative: Pharmacy calling with prescription questions and triager unable to answer question    Negative: Caller has urgent medication question about med that PCP prescribed and triager unable to answer question    Negative: Caller has NON-URGENT medication question about med that PCP prescribed and triager unable to answer question    Caller requesting a NON-URGENT new prescription or refill and triager unable to refill per department policy     Telephone/virtual visit with a provider in the clinic    Protocols used: MEDICATION QUESTION CALL-A-[place]

## 2020-12-08 NOTE — PATIENT INSTRUCTIONS
Try over the counter patanol eye drops for your symptoms     Please schedule follow up with opthamologist at [facility] at [phone] at your earliest convenience to further evaluate

## 2021-01-10 ENCOUNTER — HEALTH MAINTENANCE LETTER (OUTPATIENT)
Age: 46
End: 2021-01-10

## 2021-03-13 ENCOUNTER — HEALTH MAINTENANCE LETTER (OUTPATIENT)
Age: 46
End: 2021-03-13

## 2021-08-30 NOTE — PROGRESS NOTES
Assessment & Plan     Excessive or frequent menstruation  Discussed our understanding of menstrual changes with patient. Discussed possible etiologies for the menstrual cycle changes. Plan on sending patient for a pelvic US to further evaluate the reproductive structures. We did discuss the possibility of endometrial hyperplasia and will consider endometrial biopsy after ultrasound if indicated.   If ultrasound is normal, would recommend she pursue GI work up as well. Also discussed that even if ultrasound is normal, can consider intervention for the heavy menstrual cycles alone-briefly discussed medication vs surgical intervention and she will consider this.   We will notify her when we have all her results available and discuss options more in depth at that time. She is given an opportunity to ask questions and have them answered.  - US Pelvic Complete with Transvaginal; Future    Pelvic pain in female  See above  - US Pelvic Complete with Transvaginal; Future     ASIA Latif CNP  [facility]    Usman Mclaughlin is a 45 year old who presents for the following health issues     HPI     Heavy/ painful menstrual cycles    Symptoms began years ago and a number of years ago, she had an evaluation done elsewhere and believes this may have included a pelvic ultrasound that was normal. Was to follow up with GI and did not pursue. Cycles are regular each month at this time for 3-4 days, they are heavy most times, but some cycles are very light. Experiences cramping as well. Additionally, has left sided pain that can occur with her cycles, but also at other times when not on her cycle. The pain can come and go. Using BTL for contraception. Had been on Depo Provera to suppress cycles until about a year ago. The description of the pain varies each time. No pain on her right side. Mom has had a hysterectomy. Sometimes can feel relief from her pain with a BM. Denies nausea, abnormal  vaginal or urinary symptoms.     Review of Systems   Constitutional, HEENT, cardiovascular, pulmonary, gi and gu systems are negative, except as otherwise noted.      Objective    /86 (BP Location: Right arm, Patient Position: Sitting, Cuff Size: Adult Large)   Pulse 77   Temp 98.4  F (36.9  C) (Tympanic)   Ht 1.524 m (5')   Wt 87.6 kg (193 lb 3.2 oz)   LMP 08/25/2021 (Exact Date)   SpO2 96%   BMI 37.73 kg/m    Body mass index is 37.73 kg/m .  Physical Exam   GENERAL: healthy, alert and no distress  ABDOMEN: soft, nontender, no hepatosplenomegaly, no masses and bowel sounds normal   (female): normal female external genitalia, normal urethral meatus, vaginal mucosa pink, moist, well rugated and normal cervix, adnexae, and uterus without masses or tenderness  MS: no gross musculoskeletal defects noted, no edema  SKIN: no suspicious lesions or rashes  PSYCH: mentation appears normal, affect normal/bright

## 2021-08-31 ENCOUNTER — OFFICE VISIT (OUTPATIENT)
Dept: OBGYN | Facility: CLINIC | Age: 46
End: 2021-08-31
Payer: COMMERCIAL

## 2021-08-31 VITALS
OXYGEN SATURATION: 96 % | DIASTOLIC BLOOD PRESSURE: 86 MMHG | TEMPERATURE: 98.4 F | HEART RATE: 77 BPM | BODY MASS INDEX: 37.93 KG/M2 | SYSTOLIC BLOOD PRESSURE: 131 MMHG | HEIGHT: 60 IN | WEIGHT: 193.2 LBS

## 2021-08-31 DIAGNOSIS — N92.0 EXCESSIVE OR FREQUENT MENSTRUATION: Primary | ICD-10-CM

## 2021-08-31 DIAGNOSIS — R10.2 PELVIC PAIN IN FEMALE: ICD-10-CM

## 2021-08-31 PROCEDURE — 99203 OFFICE O/P NEW LOW 30 MIN: CPT | Performed by: NURSE PRACTITIONER

## 2021-08-31 ASSESSMENT — PAIN SCALES - GENERAL: PAINLEVEL: NO PAIN (0)

## 2021-08-31 ASSESSMENT — MIFFLIN-ST. JEOR: SCORE: 1442.85

## 2021-09-27 ENCOUNTER — ANCILLARY PROCEDURE (OUTPATIENT)
Dept: ULTRASOUND IMAGING | Facility: CLINIC | Age: 46
End: 2021-09-27
Attending: NURSE PRACTITIONER
Payer: COMMERCIAL

## 2021-09-27 DIAGNOSIS — R10.2 PELVIC PAIN IN FEMALE: ICD-10-CM

## 2021-09-27 DIAGNOSIS — N92.0 EXCESSIVE OR FREQUENT MENSTRUATION: ICD-10-CM

## 2021-09-27 PROCEDURE — 76856 US EXAM PELVIC COMPLETE: CPT | Performed by: RADIOLOGY

## 2021-09-27 PROCEDURE — 76830 TRANSVAGINAL US NON-OB: CPT | Performed by: RADIOLOGY

## 2021-09-28 ENCOUNTER — TELEPHONE (OUTPATIENT)
Dept: OBGYN | Facility: CLINIC | Age: 46
End: 2021-09-28

## 2021-09-28 DIAGNOSIS — R10.2 PELVIC PAIN IN FEMALE: Primary | ICD-10-CM

## 2021-09-28 DIAGNOSIS — N83.202 CYST OF LEFT OVARY: ICD-10-CM

## 2021-09-29 NOTE — TELEPHONE ENCOUNTER
Telephone call to patient and discussed ultrasound result. Reviewed her management options. Will plan follow up ultrasound in 8-12 weeks. Patient is interested in surgical discussion for her heavy cycles. We did discuss her ongoing pelvic/abdominal pain may not be gynecologic and surgery may or may not improve that. She will check her schedule and call back to set up consult for surgery with GYN physician. Recommend she also see GI as was previously recommended to her as her pain may be gastrointestinal. She verbalizes understanding.

## 2021-10-04 ENCOUNTER — ANCILLARY PROCEDURE (OUTPATIENT)
Dept: MAMMOGRAPHY | Facility: CLINIC | Age: 46
End: 2021-10-04
Attending: NURSE PRACTITIONER
Payer: COMMERCIAL

## 2021-10-04 DIAGNOSIS — Z12.31 VISIT FOR SCREENING MAMMOGRAM: ICD-10-CM

## 2021-10-04 PROCEDURE — 77067 SCR MAMMO BI INCL CAD: CPT | Mod: TC | Performed by: RADIOLOGY

## 2021-10-23 ENCOUNTER — HEALTH MAINTENANCE LETTER (OUTPATIENT)
Age: 46
End: 2021-10-23

## 2021-10-25 ENCOUNTER — OFFICE VISIT (OUTPATIENT)
Dept: OBGYN | Facility: CLINIC | Age: 46
End: 2021-10-25
Payer: COMMERCIAL

## 2021-10-25 VITALS
WEIGHT: 192.6 LBS | SYSTOLIC BLOOD PRESSURE: 135 MMHG | HEART RATE: 68 BPM | BODY MASS INDEX: 37.61 KG/M2 | DIASTOLIC BLOOD PRESSURE: 88 MMHG

## 2021-10-25 DIAGNOSIS — N94.6 DYSMENORRHEA: ICD-10-CM

## 2021-10-25 DIAGNOSIS — N92.1 MENORRHAGIA WITH IRREGULAR CYCLE: ICD-10-CM

## 2021-10-25 DIAGNOSIS — N83.202 CYST OF LEFT OVARY: Primary | ICD-10-CM

## 2021-10-25 PROCEDURE — 99214 OFFICE O/P EST MOD 30 MIN: CPT | Performed by: OBSTETRICS & GYNECOLOGY

## 2021-10-25 RX ORDER — BUPROPION HYDROCHLORIDE 75 MG/1
75 TABLET ORAL 2 TIMES DAILY
COMMUNITY
End: 2024-02-12

## 2021-10-25 NOTE — PROGRESS NOTES
Lissette is a 45 year old  referred here by SUKUMAR GARCIA for consultation regarding options for her pelvic pain with and without menses. F/U of ultrasound as bellow.  Her menses and pelvic pain has become a problem in the last 4-5 years.  Depo provera did not resolve problem and affecting her mood and libido.  She had used the MRENA IUD with good cycle control and no pains till it was removed post BTL..    US PELVIC COMPLETE WITH TRANSVAGINAL 2021 4:29 PM     CLINICAL HISTORY: Excessive or frequent menstruation. Pelvic pain in  female.  TECHNIQUE: Transabdominal scans were performed. Endovaginal ultrasound  was performed to better visualize the adnexa.     COMPARISON: None.     FINDINGS:     UTERUS: 6.9 x 4.0 x 5.5 cm. Uterus is normal in size and retroverted  in position. No uterine fibroids are appreciated.     ENDOMETRIUM: 8 mm. Normal smooth endometrium.     RIGHT OVARY: 3.2 x 1.4 x 2.5 cm. Normal with flow demonstrated.  Dominant follicle right ovary measures 2.1 cm.     LEFT OVARY: 1.8 x 0.9 x 1.7 cm. Normal with flow demonstrated.     No significant free pelvic fluid. Complex cystic area noted adjacent  to the left ovary measuring 2.2 x 1.8 x 1.9 cm. A paraovarian cyst is  possible. No associated internal color Doppler flow.                                                                      IMPRESSION:  Uterus and ovaries are unremarkable. Left adnexal complex cystic  lesion is present measuring 2.2 cm. Paraovarian cyst or peritoneal  inclusion cyst are possible etiologies. Follow-up ultrasound in 2 or 3  months could be performed to assess for resolution.        SHARMAINE PERERA MD   ROS: Ten point review of systems was reviewed and negative except the above.    Past Surgical History:   Procedure Laterality Date      SECTION       NECK SURGERY      2 disks and a plate     NO HISTORY OF SURGERY       THYROIDECTOMY        TUBAL LIGATION       WRIST GANGLION EXCISION         Past Medical  History:   Diagnosis Date     NO ACTIVE PROBLEMS      Past Surgical History:   Procedure Laterality Date      SECTION  2002     NECK SURGERY      2 disks and a plate     NO HISTORY OF SURGERY       THYROIDECTOMY        TUBAL LIGATION       WRIST GANGLION EXCISION       Patient Active Problem List   Diagnosis     CARDIOVASCULAR SCREENING; LDL GOAL LESS THAN 160     Itching     Dysmenorrhea       ALL/Meds: Her medication and allergy histories were reviewed and are documented in their appropriate chart areas.    SH: - tob, - EtOH,     FH: Her family history was reviewed and documented in its appropriate chart area.    PE: /88   Pulse 68   Wt 87.4 kg (192 lb 9.6 oz)   LMP 2021 (Exact Date)   Breastfeeding No   BMI 37.61 kg/m    Body mass index is 37.61 kg/m .    General Appearance:  healthy, alert, active, no distress  HEENT: NCAT  Abdomen: Soft, nontender.  Normal bowel sounds.  No masses    A/P    ICD-10-CM    1. Cyst of left ovary  N83.202 US Pelvic Complete with Transvaginal   2. Dysmenorrhea  N94.6 US Pelvic Complete with Transvaginal   3. Menorrhagia with irregular cycle  N92.1 US Pelvic Complete with Transvaginal      I reviewed risks and benefits of medical versus surgical therapy.  Medical therapy reviewed included hormonal manipulation with OCP's, Patch, Ring, Depo,Implant  or IUD.   Reviewed endometrial ablation versus hysterectomy.  Discussed that endometrial ablation is minimally invasive compared to hysterectomy but may not be definitive.  Patient is considering the MIRENA IUD. She will schedule insertion in 2 weeks.    Repeat U/S in 3 months for resolution of left ovarian cyst.    Written information was provided on the above topics.    Total time preparing to see patient with reviewing prior encounter and labs, face to face time,  and coordinating care on the same calendar date:30 mins.    CEPHAS AGBEH, MD.

## 2021-10-25 NOTE — PATIENT INSTRUCTIONS
If you have any questions regarding your visit, Please contact your care team.  [facility]: [phone]  [facility] CLINIC HOURS TELEPHONE NUMBER   Cephas Agbeh, M.D. Becky-RN Kylie-RN Heidi-RN Deanna-MA Angela-  Krupa-         Monday-[place]    8:00a.m-4:45 p.m    Tuesday--[place]     8:00a.m-4:45 p.m.    Thursday-[place]    8:00a.m-4:45 p.m.    Friday-[place]    8:00a.m-4:45 p.m    [facility]   [address]   [place] [address]   [phone]   Fax [phone]   Kumuctb-[phone]     [facility] Labor and Delivery   [address] [place] [address] [place] [address]   [phone]    [facility]  [address] [facility] [address]  [phone]  Atlxipb-[phone]   Urgent Care locations:    [facility] Monday-Friday  10 am - 8 pm  Saturday and Sunday   9 am - 5 pm   Monday-Friday   10 am- 8 pm  Saturday and Sunday  9 am - 5 pm    [phone]   If you need a medication refill, please contact your pharmacy. Please allow 3 business days for your refill to be completed.  As always, Thank you for trusting us with your healthcare needs!

## 2021-11-08 ENCOUNTER — OFFICE VISIT (OUTPATIENT)
Dept: OBGYN | Facility: CLINIC | Age: 46
End: 2021-11-08
Payer: COMMERCIAL

## 2021-11-08 ENCOUNTER — NURSE TRIAGE (OUTPATIENT)
Dept: NURSING | Facility: CLINIC | Age: 46
End: 2021-11-08

## 2021-11-08 VITALS
SYSTOLIC BLOOD PRESSURE: 138 MMHG | TEMPERATURE: 98.9 F | HEART RATE: 70 BPM | WEIGHT: 193.2 LBS | DIASTOLIC BLOOD PRESSURE: 87 MMHG | BODY MASS INDEX: 37.73 KG/M2

## 2021-11-08 DIAGNOSIS — R30.0 DYSURIA: Primary | ICD-10-CM

## 2021-11-08 LAB
ALBUMIN UR-MCNC: NEGATIVE MG/DL
APPEARANCE UR: ABNORMAL
BACTERIA #/AREA URNS HPF: ABNORMAL /HPF
BILIRUB UR QL STRIP: NEGATIVE
COLOR UR AUTO: YELLOW
GLUCOSE UR STRIP-MCNC: NEGATIVE MG/DL
HGB UR QL STRIP: ABNORMAL
KETONES UR STRIP-MCNC: NEGATIVE MG/DL
LEUKOCYTE ESTERASE UR QL STRIP: ABNORMAL
NITRATE UR QL: NEGATIVE
PH UR STRIP: 6.5 [PH] (ref 5–7)
RBC #/AREA URNS AUTO: >100 /HPF
SP GR UR STRIP: >=1.03 (ref 1–1.03)
SQUAMOUS #/AREA URNS AUTO: ABNORMAL /LPF
UROBILINOGEN UR STRIP-ACNC: 1 E.U./DL
WBC #/AREA URNS AUTO: ABNORMAL /HPF
WBC CLUMPS #/AREA URNS HPF: PRESENT /HPF

## 2021-11-08 PROCEDURE — 99213 OFFICE O/P EST LOW 20 MIN: CPT | Performed by: OBSTETRICS & GYNECOLOGY

## 2021-11-08 PROCEDURE — 87086 URINE CULTURE/COLONY COUNT: CPT | Performed by: OBSTETRICS & GYNECOLOGY

## 2021-11-08 PROCEDURE — 81001 URINALYSIS AUTO W/SCOPE: CPT | Performed by: OBSTETRICS & GYNECOLOGY

## 2021-11-08 RX ORDER — NITROFURANTOIN 25; 75 MG/1; MG/1
100 CAPSULE ORAL 2 TIMES DAILY
Qty: 14 CAPSULE | Refills: 0 | Status: SHIPPED | OUTPATIENT
Start: 2021-11-08 | End: 2024-02-12

## 2021-11-08 NOTE — PATIENT INSTRUCTIONS
If you have any questions regarding your visit, Please contact your care team.  [facility]: [phone]  [facility] CLINIC HOURS TELEPHONE NUMBER   Cephas Agbeh, M.D. Becky-RN Kylie-RN Heidi-RN Deanna-MA Angela-  Krupa-         Monday-[place]    8:00a.m-4:45 p.m    Tuesday--[place]     8:00a.m-4:45 p.m.    Thursday-[place]    8:00a.m-4:45 p.m.    Friday-[place]    8:00a.m-4:45 p.m    [facility]   [address]   [place] [address]   [phone]   Fax [phone]   Ajcepvy-[phone]     [facility] Labor and Delivery   [address] [place] [address] [place] [address]   [phone]    [facility]  [address] [facility] [address]  [phone]  [id number]-[phone]   Urgent Care locations:    [place] [facility] Monday-Friday  10 am - 8 pm  Saturday and Sunday   9 am - 5 pm   Monday-Friday   10 am- 8 pm  Saturday and Sunday  9 am - 5 pm    [phone]   If you need a medication refill, please contact your pharmacy. Please allow 3 business days for your refill to be completed.  As always, Thank you for trusting us with your healthcare needs!

## 2021-11-08 NOTE — TELEPHONE ENCOUNTER
Women's Health RN attempted to reach pt by phone twice, no answer unable to leave message. Will send pt MyChart recommending pt keep her appointment with Dr. Agbeh and report symptoms to provider.    LEV SparrowN RN

## 2021-11-08 NOTE — TELEPHONE ENCOUNTER
Patient calling reporting having an appointment today at 3pm at [facility], think she has a bladder infection, hurts, wondering if she should still go get her Mirena today at 3pm or get checked out for a bladder infection, reports having multiple in her early 20's. Patient having a lot of bladder pressure and pain with the patient reporting increased frequency only being able to go a few drops at a time. Requesting provider input.     Arabella Davis RN 11/08/21 2:05 PM   [facility] Triage Nurse Advisor      Reason for Disposition    Unable to urinate (or only a few drops) and bladder feels very full    Additional Information    Negative: Shock suspected (e.g., cold/pale/clammy skin, too weak to stand, low BP, rapid pulse)    Negative: Sounds like a life-threatening emergency to the triager    Protocols used: URINATION PAIN - FEMALE-A-OH

## 2021-11-08 NOTE — PROGRESS NOTES
Lissette is a 46 year old  referred here by self for consultation regarding painful urination within the last day. No fever or chills..  IUD insertion will be postponed for a week.  ROS: Ten point review of systems was reviewed and negative except the above.    Gyne: - abn pap (last pap ), - STD's    Past Medical History:   Diagnosis Date     NO ACTIVE PROBLEMS      Past Surgical History:   Procedure Laterality Date      SECTION  2002     NECK SURGERY      2 disks and a plate     NO HISTORY OF SURGERY       THYROIDECTOMY        TUBAL LIGATION       WRIST GANGLION EXCISION       Patient Active Problem List   Diagnosis     CARDIOVASCULAR SCREENING; LDL GOAL LESS THAN 160     Itching     Dysmenorrhea       ALL/Meds: Her medication and allergy histories were reviewed and are documented in their appropriate chart areas.    SH: - tob, - EtOH,     FH: Her family history was reviewed and documented in its appropriate chart area.    PE: /87   Pulse 70   Temp 98.9  F (37.2  C)   Wt 87.6 kg (193 lb 3.2 oz)   LMP 10/26/2021 (Exact Date)   Breastfeeding No   BMI 37.73 kg/m    Body mass index is 37.73 kg/m .    General Appearance:  healthy, alert, active, no distress  HEENT: NCAT  Abdomen: suprapubic tenderness.  Results for orders placed or performed in visit on 21   UA with Microscopic reflex to Culture - lab collect     Status: Abnormal    Specimen: Urine, Clean Catch   Result Value Ref Range    Color Urine Yellow Colorless, Straw, Light Yellow, Yellow    Appearance Urine Slightly Cloudy (A) Clear    Glucose Urine Negative Negative mg/dL    Bilirubin Urine Negative Negative    Ketones Urine Negative Negative mg/dL    Specific Gravity Urine >=1.030 1.003 - 1.035    Blood Urine Large (A) Negative    pH Urine 6.5 5.0 - 7.0    Protein Albumin Urine Negative Negative mg/dL    Urobilinogen Urine 1.0 0.2, 1.0 E.U./dL    Nitrite Urine Negative Negative    Leukocyte Esterase Urine Small (A) Negative    Urine Microscopic     Status: Abnormal   Result Value Ref Range    Bacteria Urine Moderate (A) None Seen /HPF    RBC Urine >100 (A) 0-2 /HPF /HPF    WBC Urine  (A) 0-5 /HPF /HPF    Squamous Epithelials Urine Moderate (A) None Seen /LPF    WBC Clumps Urine Present (A) None Seen /HPF       A/P    ICD-10-CM    1. Dysuria  R30.0 UA with Microscopic reflex to Culture - lab collect     UA with Microscopic reflex to Culture - lab collect     Urine Microscopic     Urine Culture     nitroFURantoin macrocrystal-monohydrate (MACROBID) 100 MG capsule     CEPHAS AGBEH, MD.    CEPHAS AGBEH, MD.

## 2021-11-09 LAB — BACTERIA UR CULT: NO GROWTH

## 2021-11-15 ENCOUNTER — OFFICE VISIT (OUTPATIENT)
Dept: OBGYN | Facility: CLINIC | Age: 46
End: 2021-11-15
Payer: COMMERCIAL

## 2021-11-15 VITALS — HEART RATE: 82 BPM | DIASTOLIC BLOOD PRESSURE: 74 MMHG | SYSTOLIC BLOOD PRESSURE: 107 MMHG | OXYGEN SATURATION: 98 %

## 2021-11-15 DIAGNOSIS — Z30.430 ENCOUNTER FOR IUD INSERTION: Primary | ICD-10-CM

## 2021-11-15 PROCEDURE — 58300 INSERT INTRAUTERINE DEVICE: CPT | Performed by: OBSTETRICS & GYNECOLOGY

## 2021-11-15 NOTE — PATIENT INSTRUCTIONS
If you have any questions regarding your visit, Please contact your care team.  [facility]: [phone]  [facility] CLINIC HOURS TELEPHONE NUMBER   Cephas Agbeh, M.D. Becky-RN Kylie-RN Heidi-RN Deanna-MA Angela-  Krupa-         Monday-[place]    8:00a.m-4:45 p.m    Tuesday--[place]     8:00a.m-4:45 p.m.    Thursday-[place]    8:00a.m-4:45 p.m.    Friday-[place]    8:00a.m-4:45 p.m    [facility]   [address]   [place] [address]   [phone]   Fax [phone]   Jdleqpj-[phone]     [facility] Labor and Delivery   [address] [place] [address] [place] [address]   [phone]    [facility]  [address] [facility] [address]  [phone]  Lwsuplr-[phone]   Urgent Care locations:    [facility] Monday-Friday  10 am - 8 pm  Saturday and Sunday   9 am - 5 pm   Monday-Friday   10 am- 8 pm  Saturday and Sunday  9 am - 5 pm    [phone]   If you need a medication refill, please contact your pharmacy. Please allow 3 business days for your refill to be completed.  As always, Thank you for trusting us with your healthcare needs!

## 2021-11-15 NOTE — PROGRESS NOTES
Lissette England is a 46 year old  who presents today requesting placement of an Mirena iud.  For cycle contyrol.  The patient meets and is agreeable to the following conditions:  She is not interested in conception in the near future.   She currently is in a stable, monogamous relationship.   There is no previous history of pelvic inflammatory disease.   There is no previous history of ectopic pregnancy.   She is willing to check monthly for the IUD string.   BTL for contraception  We discussed risks, benefits, and alternatives including but not limited to:   Possibility of pregnancy and ectopic pregnancy.  Possibility of pelvic inflammatory disease, particularly with new partners.  Risk of uterine perforation or IUD expulsion.  Possibility of difficult removal.  Spotting or heavy bleeding.  Cramping, pain or infection during or after insertion.    The patient was given patient information on the IUD and the patient education brochure from the .  The patient has given consent to proceed with placement of the IUD.  She wishes to proceed.  All questions answered.      PROCEDURE:  Type of IUD: Mirena    The patient has taken 600-800mg of Ibuprofen prior to the procedure.   She is placed in a dorsal lithotomy potion and a pelvic exam is performed to determine the position of the uterus.  The cervix is identified and cleaned with betadine.  A single tooth tenaculum is applied to the anterior lip of the cervix for stabilization.   The uterus sounded to 7.0 cm. (Target sound depth is 6.5 cm to 8.5 cm.)  The IUD insertion tube is prepared to manufacturers recommendations and inserted into the uterus under sterile conditions in the usual fashion.  The IUD string is then cut to 3.5 cm.   LOT# [id number]  EXP: DEC 2023  The patient tolerated this procedure without immediate complication.  The patient is to return or call immediately for any unexplained fever, abdominal or pelvic pain, excessive bleeding, possibility  of pregnancy, foul-smelling discharge, sense that the IUD has been expelled.  All questions were answered.    ICD-10-CM    1. Encounter for IUD insertion  Z30.430 INSERTION OF IUD FOR THERAPEUTIC PURPOSES     levonorgestrel (MIRENA) 20 MCG/24HR IUD 20 mcg     Return to clinic in 1 month for IUD check    Cephas Mawuena Agbeh, MD

## 2022-02-12 ENCOUNTER — HEALTH MAINTENANCE LETTER (OUTPATIENT)
Age: 47
End: 2022-02-12

## 2022-10-09 ENCOUNTER — HEALTH MAINTENANCE LETTER (OUTPATIENT)
Age: 47
End: 2022-10-09

## 2023-02-18 ENCOUNTER — HEALTH MAINTENANCE LETTER (OUTPATIENT)
Age: 48
End: 2023-02-18

## 2023-10-18 ENCOUNTER — OFFICE VISIT (OUTPATIENT)
Dept: URGENT CARE | Facility: URGENT CARE | Age: 48
End: 2023-10-18
Payer: COMMERCIAL

## 2023-10-18 VITALS
HEART RATE: 78 BPM | WEIGHT: 189 LBS | TEMPERATURE: 97.9 F | SYSTOLIC BLOOD PRESSURE: 127 MMHG | BODY MASS INDEX: 36.91 KG/M2 | RESPIRATION RATE: 16 BRPM | DIASTOLIC BLOOD PRESSURE: 86 MMHG | OXYGEN SATURATION: 96 %

## 2023-10-18 DIAGNOSIS — L91.8 SKIN TAG: Primary | ICD-10-CM

## 2023-10-18 PROCEDURE — 99207 PR NO CHARGE LOS: CPT | Performed by: PHYSICIAN ASSISTANT

## 2023-10-18 PROCEDURE — 11200 RMVL SKIN TAGS UP TO&INC 15: CPT | Performed by: PHYSICIAN ASSISTANT

## 2023-10-18 ASSESSMENT — ENCOUNTER SYMPTOMS
ENDOCRINE NEGATIVE: 1
JOINT SWELLING: 0
PALPITATIONS: 0
CARDIOVASCULAR NEGATIVE: 1
ARTHRALGIAS: 0
VOMITING: 0
SHORTNESS OF BREATH: 0
BACK PAIN: 0
COUGH: 0
WEAKNESS: 0
SORE THROAT: 0
NECK PAIN: 0
DIARRHEA: 0
ALLERGIC/IMMUNOLOGIC NEGATIVE: 1
ROS SKIN COMMENTS: SKIN LESION
HEADACHES: 0
CHILLS: 0
DIZZINESS: 0
NAUSEA: 0
FEVER: 0
RHINORRHEA: 0
RESPIRATORY NEGATIVE: 1
WOUND: 0
MUSCULOSKELETAL NEGATIVE: 1
NECK STIFFNESS: 0
MYALGIAS: 0
LIGHT-HEADEDNESS: 0
EYES NEGATIVE: 1

## 2023-10-18 NOTE — PROGRESS NOTES
Chief Complaint:    Chief Complaint   Patient presents with    Urgent Care    Derm Problem     Per patient believes a skin tag on her right under arm is infected states she was in [place] last week, and between the heat & tank tops it became painful.      Medical Decision Making:    Vital signs reviewed by Ozzie Gutierrez PA-C  /86   Pulse 78   Temp 97.9  F (36.6  C) (Tympanic)   Resp 16   Wt 85.7 kg (189 lb)   SpO2 96%   BMI 36.91 kg/m      Differential Diagnosis:  Skin tag     ASSESSMENT:     1. Skin tag           PLAN:     Area cleaned with alcohol.  Area anesthetized with 1% Lidocaine with Epi.  Small scissors used to remove skin tag.  Butane cautery used to stop small capillary bleed.  Antibiotic ointment and pressure dressing applied.  Patient tolerated this procedure well.    Patient instructed to follow up with PCP in 1 week if symptoms are not improving.  Sooner if symptoms worsen.  Worrisome symptoms discussed with instructions to go to the ED.  Patient verbalized understanding and agreed with this plan.    Labs:     No results found for any visits on 10/18/23.    Current Meds:    Current Outpatient Medications:     buPROPion (WELLBUTRIN) 75 MG tablet, Take 75 mg by mouth 2 times daily (Patient not taking: Reported on 11/8/2021), Disp: , Rfl:     nitroFURantoin macrocrystal-monohydrate (MACROBID) 100 MG capsule, Take 1 capsule (100 mg) by mouth 2 times daily, Disp: 14 capsule, Rfl: 0    Allergies:  No Known Allergies    SUBJECTIVE    HPI: Lissette England is an 47 year old female who presents for evaluation and treatment of painful skin tag in the R axilla.  The skin tag has been getting caught on things and is now painful.  No other complaints at this time.      ROS:      Review of Systems   Constitutional:  Negative for chills and fever.   HENT:  Negative for congestion, ear pain, rhinorrhea and sore throat.    Eyes: Negative.    Respiratory: Negative.  Negative for cough and shortness of breath.     Cardiovascular: Negative.  Negative for chest pain and palpitations.   Gastrointestinal:  Negative for diarrhea, nausea and vomiting.   Endocrine: Negative.    Genitourinary: Negative.    Musculoskeletal: Negative.  Negative for arthralgias, back pain, joint swelling, myalgias, neck pain and neck stiffness.   Skin:  Negative for rash and wound.        Skin Lesion   Allergic/Immunologic: Negative.  Negative for immunocompromised state.   Neurological:  Negative for dizziness, weakness, light-headedness and headaches.        Family History   Family History   Problem Relation Age of Onset    Hypertension Mother     Cancer Maternal Grandfather         prostate    No Known Problems Father        Social History  Social History     Socioeconomic History    Marital status: Single     Spouse name: Not on file    Number of children: Not on file    Years of education: Not on file    Highest education level: Not on file   Occupational History    Not on file   Tobacco Use    Smoking status: Every Day     Packs/day: 0.50     Years: 0.00     Additional pack years: 0.00     Total pack years: 0.00     Types: Cigarettes    Smokeless tobacco: Never   Vaping Use    Vaping Use: Some days   Substance and Sexual Activity    Alcohol use: Yes    Drug use: No    Sexual activity: Yes     Partners: Male     Birth control/protection: Female Surgical   Other Topics Concern    Parent/sibling w/ CABG, MI or angioplasty before 65F 55M? Not Asked   Social History Narrative    Not on file     Social Determinants of Health     Financial Resource Strain: Not on file   Food Insecurity: Not on file   Transportation Needs: Not on file   Physical Activity: Not on file   Stress: Not on file   Social Connections: Not on file   Interpersonal Safety: Not on file   Housing Stability: Not on file        Surgical History:  Past Surgical History:   Procedure Laterality Date     SECTION  2002    NECK SURGERY      2 disks and a plate    NO HISTORY OF  SURGERY      THYROIDECTOMY       TUBAL LIGATION      WRIST GANGLION EXCISION          Problem List:  Patient Active Problem List   Diagnosis    CARDIOVASCULAR SCREENING; LDL GOAL LESS THAN 160    Itching    Dysmenorrhea           OBJECTIVE:     Vital signs noted and reviewed by Ozzie Gutierrez PA-C  /86   Pulse 78   Temp 97.9  F (36.6  C) (Tympanic)   Resp 16   Wt 85.7 kg (189 lb)   SpO2 96%   BMI 36.91 kg/m       PEFR:    Physical Exam  Vitals and nursing note reviewed.   Constitutional:       General: She is not in acute distress.     Appearance: She is well-developed. She is not ill-appearing, toxic-appearing or diaphoretic.   HENT:      Head: Normocephalic and atraumatic.      Right Ear: Tympanic membrane and external ear normal. No drainage, swelling or tenderness. Tympanic membrane is not perforated, erythematous, retracted or bulging.      Left Ear: Tympanic membrane and external ear normal. No drainage, swelling or tenderness. Tympanic membrane is not perforated, erythematous, retracted or bulging.      Nose: No mucosal edema, congestion or rhinorrhea.      Right Sinus: No maxillary sinus tenderness or frontal sinus tenderness.      Left Sinus: No maxillary sinus tenderness or frontal sinus tenderness.      Mouth/Throat:      Pharynx: No pharyngeal swelling, oropharyngeal exudate, posterior oropharyngeal erythema or uvula swelling.      Tonsils: No tonsillar abscesses.   Eyes:      Pupils: Pupils are equal, round, and reactive to light.   Neck:      Trachea: Trachea normal.   Cardiovascular:      Rate and Rhythm: Normal rate and regular rhythm.      Heart sounds: Normal heart sounds, S1 normal and S2 normal. No murmur heard.     No friction rub. No gallop.   Pulmonary:      Effort: Pulmonary effort is normal. No respiratory distress.      Breath sounds: Normal breath sounds. No decreased breath sounds, wheezing, rhonchi or rales.   Abdominal:      General: Bowel sounds are normal. There is no  distension.      Palpations: Abdomen is soft. Abdomen is not rigid. There is no mass.      Tenderness: There is no abdominal tenderness. There is no guarding or rebound.   Musculoskeletal:      Cervical back: Full passive range of motion without pain, normal range of motion and neck supple.   Lymphadenopathy:      Cervical: No cervical adenopathy.   Skin:     General: Skin is warm and dry.      Comments: Skin tag in R axilla.   Neurological:      Mental Status: She is alert and oriented to person, place, and time.      Cranial Nerves: No cranial nerve deficit.      Deep Tendon Reflexes: Reflexes are normal and symmetric.   Psychiatric:         Behavior: Behavior normal. Behavior is cooperative.         Thought Content: Thought content normal.         Judgment: Judgment normal.             Ozzie Gutierrez PA-C  10/18/2023, 4:43 PM

## 2024-02-12 ENCOUNTER — OFFICE VISIT (OUTPATIENT)
Dept: FAMILY MEDICINE | Facility: CLINIC | Age: 49
End: 2024-02-12
Payer: COMMERCIAL

## 2024-02-12 VITALS
WEIGHT: 193 LBS | BODY MASS INDEX: 37.89 KG/M2 | HEIGHT: 60 IN | HEART RATE: 70 BPM | SYSTOLIC BLOOD PRESSURE: 136 MMHG | OXYGEN SATURATION: 98 % | RESPIRATION RATE: 16 BRPM | DIASTOLIC BLOOD PRESSURE: 78 MMHG | TEMPERATURE: 97.5 F

## 2024-02-12 DIAGNOSIS — M54.12 CERVICAL RADICULOPATHY: Primary | ICD-10-CM

## 2024-02-12 DIAGNOSIS — M25.561 ACUTE PAIN OF RIGHT KNEE: ICD-10-CM

## 2024-02-12 DIAGNOSIS — Z12.11 SCREEN FOR COLON CANCER: ICD-10-CM

## 2024-02-12 PROCEDURE — 99213 OFFICE O/P EST LOW 20 MIN: CPT | Performed by: PHYSICIAN ASSISTANT

## 2024-02-12 ASSESSMENT — PAIN SCALES - GENERAL: PAINLEVEL: NO PAIN (0)

## 2024-02-12 NOTE — PROGRESS NOTES
Assessment & Plan     Cervical radiculopathy  R arm affected with muscle soreness as well.  Massage, heat, with exercises. Could not find stretches in system I want but did  patient on nerve glides. Had disc surgery in past, likely disc below that is current problem.    Acute pain of right knee  With popping. No injury to site. Mild swelling. Consistent with mild arthritis.  expected course of disease discussed with patient.  Plan film xrays in future if needed. Ice as needed, stretching encouraged    Screen for colon cancer  Ordered   - Colonoscopy Screening  Referral; Future      Nicotine/Tobacco Cessation  She reports that she has been smoking cigarettes. She has been smoking an average of 0.5 packs per day. She has never used smokeless tobacco.  Nicotine/Tobacco Cessation Plan  Self help information given to patient      BMI  Estimated body mass index is 37.69 kg/m  as calculated from the following:    Height as of this encounter: 1.524 m (5').    Weight as of this encounter: 87.5 kg (193 lb).   Weight management plan: Discussed healthy diet and exercise guidelines      Follow up if pain wosening     Usman Mclaughlin is a 48 year old, presenting for the following health issues:  Neck Pain and Knee Pain        2/12/2024     2:47 PM   Additional Questions   Roomed by Benji Leblanc MA   Accompanied by Self     History of Present Illness       Reason for visit:  Knee and neck pain  Symptom onset:  1-2 weeks ago    She eats 0-1 servings of fruits and vegetables daily.She consumes 2 sweetened beverage(s) daily.She exercises with enough effort to increase her heart rate 20 to 29 minutes per day.  She exercises with enough effort to increase her heart rate 3 or less days per week.   She is taking medications regularly.           Review of Systems  Constitutional, neuro, ENT, endocrine, pulmonary, cardiac, gastrointestinal, genitourinary, musculoskeletal, integument and psychiatric systems are negative,  except as otherwise noted.      Objective    /78   Pulse 70   Temp 97.5  F (36.4  C) (Tympanic)   Resp 16   Ht 1.524 m (5')   Wt 87.5 kg (193 lb)   SpO2 98%   Breastfeeding No   BMI 37.69 kg/m    Body mass index is 37.69 kg/m .    Physical Exam   GENERAL: alert and no distress  EYES: Eyes grossly normal to inspection, PERRL and conjunctivae and sclerae normal  NECK: no adenopathy, no asymmetry, masses, or scars  MS: no gross musculoskeletal defects noted, no edema  NEURO: Normal strength and tone, mentation intact and speech normal  PSYCH: mentation appears normal, affect normal/bright  BACK: Tenderness R subscapularis area with R arm radiation of pain. Paraspinals tender and tight cervically           Signed Electronically by: Julio Hazel PA-C

## 2024-03-16 ENCOUNTER — HEALTH MAINTENANCE LETTER (OUTPATIENT)
Age: 49
End: 2024-03-16

## 2024-04-26 ENCOUNTER — ANCILLARY PROCEDURE (OUTPATIENT)
Dept: MAMMOGRAPHY | Facility: CLINIC | Age: 49
End: 2024-04-26
Attending: NURSE PRACTITIONER
Payer: COMMERCIAL

## 2024-04-26 DIAGNOSIS — Z12.31 VISIT FOR SCREENING MAMMOGRAM: ICD-10-CM

## 2024-04-26 PROCEDURE — 77067 SCR MAMMO BI INCL CAD: CPT | Mod: TC | Performed by: RADIOLOGY

## 2024-07-01 ENCOUNTER — ANCILLARY PROCEDURE (OUTPATIENT)
Dept: GENERAL RADIOLOGY | Facility: CLINIC | Age: 49
End: 2024-07-01
Attending: NURSE PRACTITIONER
Payer: COMMERCIAL

## 2024-07-01 ENCOUNTER — OFFICE VISIT (OUTPATIENT)
Dept: PHYSICAL MEDICINE AND REHAB | Facility: CLINIC | Age: 49
End: 2024-07-01
Payer: COMMERCIAL

## 2024-07-01 VITALS
WEIGHT: 193.6 LBS | OXYGEN SATURATION: 96 % | BODY MASS INDEX: 36.55 KG/M2 | HEIGHT: 61 IN | HEART RATE: 77 BPM | DIASTOLIC BLOOD PRESSURE: 88 MMHG | SYSTOLIC BLOOD PRESSURE: 142 MMHG

## 2024-07-01 DIAGNOSIS — M54.12 CERVICAL RADICULOPATHY: ICD-10-CM

## 2024-07-01 DIAGNOSIS — R20.2 NUMBNESS AND TINGLING: ICD-10-CM

## 2024-07-01 DIAGNOSIS — Z98.1 S/P CERVICAL SPINAL FUSION: ICD-10-CM

## 2024-07-01 DIAGNOSIS — M54.9 UPPER BACK PAIN: ICD-10-CM

## 2024-07-01 DIAGNOSIS — R20.0 NUMBNESS AND TINGLING: ICD-10-CM

## 2024-07-01 DIAGNOSIS — M62.838 MUSCLE SPASM: ICD-10-CM

## 2024-07-01 DIAGNOSIS — M54.2 NECK PAIN: ICD-10-CM

## 2024-07-01 DIAGNOSIS — M54.2 NECK PAIN: Primary | ICD-10-CM

## 2024-07-01 PROCEDURE — 72040 X-RAY EXAM NECK SPINE 2-3 VW: CPT | Performed by: RADIOLOGY

## 2024-07-01 PROCEDURE — 99203 OFFICE O/P NEW LOW 30 MIN: CPT | Performed by: NURSE PRACTITIONER

## 2024-07-01 RX ORDER — IBUPROFEN 200 MG
200 TABLET ORAL EVERY 4 HOURS PRN
COMMUNITY

## 2024-07-01 RX ORDER — METHYLPREDNISOLONE 4 MG
TABLET, DOSE PACK ORAL
Qty: 21 TABLET | Refills: 0 | Status: SHIPPED | OUTPATIENT
Start: 2024-07-01 | End: 2024-08-12

## 2024-07-01 ASSESSMENT — PAIN SCALES - GENERAL: PAINLEVEL: MODERATE PAIN (5)
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
## 2024-07-01 NOTE — PATIENT INSTRUCTIONS
It was a pleasure seeing you in the clinic today.  As discussed, we made the following updates to your plan of care:       An XR order was added today.  I will send you a message about the x-ray result through [employer].     A MRI order was added today, which you can schedule after today's visit.       A Medrol Dose Pack (Prednisone Taper) was prescribed today for your acute pain. Please follow the dosing instruction on prescription bottle.   Avoid NSAIDs while taking this medication (such as ibuprofen, aleve, excedrin, naproxen, etc.).  POSSIBLE STEROID SIDE EFFECTS   -If you experience these, it should only last for a short period-   Change in menstrual flow   Swelling   Increased appetite   Skin redness (flushness)   Skin rash   Mouth (oral) irritation   Blood sugar (glucose) levels   Sweats   Mood changes     Please schedule follow up with me after MRI. (Please schedule the follow up at least 1 day after the MRI to give radiology time to do the report as well.)       Thank you,  Alysno Patel NP  Physical Medicine and Rehabilitation, Medical Spine  PM&R clinic Phone: [phone]  PM[facility] clinic Fax: [phone]
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
## 2024-07-01 NOTE — NURSING NOTE
Chief Complaint   Patient presents with    New Patient       Neck pain      Vitals were taken and medications were reconciled.   Frank Mustafa, EMT  1:32 PM

## 2024-07-21 ENCOUNTER — HOSPITAL ENCOUNTER (OUTPATIENT)
Dept: MRI IMAGING | Facility: CLINIC | Age: 49
Discharge: HOME | End: 2024-07-21
Attending: NURSE PRACTITIONER | Admitting: NURSE PRACTITIONER
Payer: COMMERCIAL

## 2024-07-21 DIAGNOSIS — M54.12 CERVICAL RADICULOPATHY: ICD-10-CM

## 2024-07-21 DIAGNOSIS — M54.9 UPPER BACK PAIN: ICD-10-CM

## 2024-07-21 DIAGNOSIS — R20.2 NUMBNESS AND TINGLING: ICD-10-CM

## 2024-07-21 DIAGNOSIS — M54.2 NECK PAIN: ICD-10-CM

## 2024-07-21 DIAGNOSIS — M62.838 MUSCLE SPASM: ICD-10-CM

## 2024-07-21 DIAGNOSIS — R20.0 NUMBNESS AND TINGLING: ICD-10-CM

## 2024-07-21 DIAGNOSIS — Z98.1 S/P CERVICAL SPINAL FUSION: ICD-10-CM

## 2024-07-21 PROCEDURE — A9585 GADOBUTROL INJECTION: HCPCS | Performed by: NURSE PRACTITIONER

## 2024-07-21 PROCEDURE — 72156 MRI NECK SPINE W/O & W/DYE: CPT

## 2024-07-21 PROCEDURE — 255N000002 HC RX 255 OP 636: Performed by: NURSE PRACTITIONER

## 2024-07-21 RX ORDER — GADOBUTROL 604.72 MG/ML
8.5 INJECTION INTRAVENOUS ONCE
Status: COMPLETED | OUTPATIENT
Start: 2024-07-21 | End: 2024-07-21

## 2024-07-21 RX ADMIN — GADOBUTROL 8.5 ML: 604.72 INJECTION INTRAVENOUS at 13:09

## 2024-07-24 ENCOUNTER — VIRTUAL VISIT (OUTPATIENT)
Dept: PHYSICAL MEDICINE AND REHAB | Facility: CLINIC | Age: 49
End: 2024-07-24
Payer: COMMERCIAL

## 2024-07-24 ENCOUNTER — MYC MEDICAL ADVICE (OUTPATIENT)
Dept: PHYSICAL MEDICINE AND REHAB | Facility: CLINIC | Age: 49
End: 2024-07-24

## 2024-07-24 VITALS — WEIGHT: 193 LBS | BODY MASS INDEX: 36.44 KG/M2 | HEIGHT: 61 IN

## 2024-07-24 DIAGNOSIS — Z98.1 S/P CERVICAL SPINAL FUSION: ICD-10-CM

## 2024-07-24 DIAGNOSIS — R20.2 NUMBNESS AND TINGLING: ICD-10-CM

## 2024-07-24 DIAGNOSIS — R20.0 NUMBNESS AND TINGLING: ICD-10-CM

## 2024-07-24 DIAGNOSIS — M54.9 UPPER BACK PAIN: ICD-10-CM

## 2024-07-24 DIAGNOSIS — M62.838 MUSCLE SPASM: ICD-10-CM

## 2024-07-24 DIAGNOSIS — M54.2 NECK PAIN: ICD-10-CM

## 2024-07-24 DIAGNOSIS — M54.12 CERVICAL RADICULOPATHY: Primary | ICD-10-CM

## 2024-07-24 PROCEDURE — 99214 OFFICE O/P EST MOD 30 MIN: CPT | Mod: 95 | Performed by: NURSE PRACTITIONER

## 2024-07-24 RX ORDER — GABAPENTIN 300 MG/1
300 CAPSULE ORAL 3 TIMES DAILY
Qty: 90 CAPSULE | Refills: 3 | Status: SHIPPED | OUTPATIENT
Start: 2024-07-24

## 2024-07-24 ASSESSMENT — PAIN SCALES - GENERAL: PAINLEVEL: NO PAIN (1)

## 2024-07-24 NOTE — NURSING NOTE
Current patient location: [id number] [place] [id number]    Is the patient currently in the state of [place]? YES    Visit mode:VIDEO    If the visit is dropped, the patient can be reconnected by: VIDEO VISIT: Text to cell phone:   Telephone Information:   Mobile [phone]       Will anyone else be joining the visit? NO  (If patient encounters technical issues they should call [phone])    How would you like to obtain your AVS? MyChart    Are changes needed to the allergy or medication list? No    Are refills needed on medications prescribed by this physician? NO    Reason for visit: Follow Up    Irasema KEANE
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
## 2024-07-24 NOTE — PATIENT INSTRUCTIONS
It was a pleasure seeing you via telehealth today.  As discussed, we made the following updates to your plan of care:    An XR order was added today.  I will send you a message about the x-ray result through [employer]   Imaging [facility] [phone] Clinics and Surgery Center [facility] ([facility] and [facility]), [facility], including [facility], [facility] [phone] [facility], [facility] Clinics including [facility] [place], and [facility] [phone] [facility], [facility], [facility], [facility], including [place], [facility], and [facility] [phone] [facility], [facility], [facility], including [place], [facility], and [place]     An order for EMG was added today. The clinic will call you to schedule. You may also call the Neurology clinic at [phone] if you do not hear from them in the next couple of days.     Spine surgery consultation was added.     Gabapentin (Neurontin) was prescribed that you can slowly increase as tolerated, according to the chart below:             AM        PM       BEDTIME    Day 0-5         -            -             300mg  Day 5-10      300       -              300mg  Day 10+       300       300         300mg    Continue to increase your dose as discussed above if you are not experiencing any side effects (in other words - if you experience side effects do not progress to the next week). If you are experiencing any side effects, return to the previous dose that was tolerable and continue until follow-up.     The most common side effect is sedation. Do not drive a motor vehicle until after you are familiar with how the medication may impact your attention and reaction.    Note that it may take several weeks to  notice the benefits of this medication.   Do not abruptly stopped this medication prior to consulting with a physician.          Thank you,  Alyson Patel NP  Physical Medicine and Rehabilitation, Medical Spine  PM&R clinic Phone: [phone]  PM&R clinic Fax: [phone]

## 2024-07-30 ENCOUNTER — ANCILLARY PROCEDURE (OUTPATIENT)
Dept: GENERAL RADIOLOGY | Facility: CLINIC | Age: 49
End: 2024-07-30
Attending: NURSE PRACTITIONER
Payer: COMMERCIAL

## 2024-07-30 DIAGNOSIS — Z98.1 S/P CERVICAL SPINAL FUSION: ICD-10-CM

## 2024-07-30 DIAGNOSIS — M54.2 NECK PAIN: ICD-10-CM

## 2024-07-30 DIAGNOSIS — M54.9 UPPER BACK PAIN: ICD-10-CM

## 2024-07-30 DIAGNOSIS — M54.12 CERVICAL RADICULOPATHY: ICD-10-CM

## 2024-07-30 DIAGNOSIS — M62.838 MUSCLE SPASM: ICD-10-CM

## 2024-07-30 DIAGNOSIS — R20.0 NUMBNESS AND TINGLING: ICD-10-CM

## 2024-07-30 DIAGNOSIS — R20.2 NUMBNESS AND TINGLING: ICD-10-CM

## 2024-07-30 PROCEDURE — 72040 X-RAY EXAM NECK SPINE 2-3 VW: CPT | Mod: TC | Performed by: INTERNAL MEDICINE

## 2024-08-12 ENCOUNTER — ORDERS ONLY (AUTO-RELEASED) (OUTPATIENT)
Dept: FAMILY MEDICINE | Facility: CLINIC | Age: 49
End: 2024-08-12

## 2024-08-12 ENCOUNTER — OFFICE VISIT (OUTPATIENT)
Dept: FAMILY MEDICINE | Facility: CLINIC | Age: 49
End: 2024-08-12
Payer: COMMERCIAL

## 2024-08-12 VITALS
DIASTOLIC BLOOD PRESSURE: 89 MMHG | TEMPERATURE: 98.3 F | BODY MASS INDEX: 38.28 KG/M2 | WEIGHT: 195 LBS | SYSTOLIC BLOOD PRESSURE: 136 MMHG | HEART RATE: 71 BPM | RESPIRATION RATE: 12 BRPM | HEIGHT: 60 IN | OXYGEN SATURATION: 99 %

## 2024-08-12 DIAGNOSIS — E04.2 MULTINODULAR THYROID: ICD-10-CM

## 2024-08-12 DIAGNOSIS — R21 RASH: ICD-10-CM

## 2024-08-12 DIAGNOSIS — Z12.11 COLON CANCER SCREENING: ICD-10-CM

## 2024-08-12 DIAGNOSIS — Z00.00 ROUTINE GENERAL MEDICAL EXAMINATION AT A HEALTH CARE FACILITY: Primary | ICD-10-CM

## 2024-08-12 DIAGNOSIS — Z23 ENCOUNTER FOR IMMUNIZATION: ICD-10-CM

## 2024-08-12 DIAGNOSIS — R06.83 SNORING: ICD-10-CM

## 2024-08-12 DIAGNOSIS — N83.202 LEFT OVARIAN CYST: ICD-10-CM

## 2024-08-12 DIAGNOSIS — Z13.1 SCREENING FOR DIABETES MELLITUS: ICD-10-CM

## 2024-08-12 DIAGNOSIS — Z13.220 LIPID SCREENING: ICD-10-CM

## 2024-08-12 DIAGNOSIS — Z12.4 CERVICAL CANCER SCREENING: ICD-10-CM

## 2024-08-12 DIAGNOSIS — R10.32 LLQ ABDOMINAL PAIN: ICD-10-CM

## 2024-08-12 PROCEDURE — 87624 HPV HI-RISK TYP POOLED RSLT: CPT | Performed by: PHYSICIAN ASSISTANT

## 2024-08-12 PROCEDURE — 99213 OFFICE O/P EST LOW 20 MIN: CPT | Mod: 25 | Performed by: PHYSICIAN ASSISTANT

## 2024-08-12 PROCEDURE — 99396 PREV VISIT EST AGE 40-64: CPT | Mod: 25 | Performed by: PHYSICIAN ASSISTANT

## 2024-08-12 PROCEDURE — 90677 PCV20 VACCINE IM: CPT | Performed by: PHYSICIAN ASSISTANT

## 2024-08-12 PROCEDURE — 90472 IMMUNIZATION ADMIN EACH ADD: CPT | Performed by: PHYSICIAN ASSISTANT

## 2024-08-12 PROCEDURE — 90715 TDAP VACCINE 7 YRS/> IM: CPT | Performed by: PHYSICIAN ASSISTANT

## 2024-08-12 PROCEDURE — G0145 SCR C/V CYTO,THINLAYER,RESCR: HCPCS | Performed by: PHYSICIAN ASSISTANT

## 2024-08-12 PROCEDURE — 90471 IMMUNIZATION ADMIN: CPT | Performed by: PHYSICIAN ASSISTANT

## 2024-08-12 RX ORDER — CLOTRIMAZOLE AND BETAMETHASONE DIPROPIONATE 10; .64 MG/G; MG/G
CREAM TOPICAL 2 TIMES DAILY
Qty: 45 G | Refills: 0 | Status: SHIPPED | OUTPATIENT
Start: 2024-08-12

## 2024-08-12 SDOH — HEALTH STABILITY: PHYSICAL HEALTH: ON AVERAGE, HOW MANY MINUTES DO YOU ENGAGE IN EXERCISE AT THIS LEVEL?: 20 MIN

## 2024-08-12 SDOH — HEALTH STABILITY: PHYSICAL HEALTH: ON AVERAGE, HOW MANY DAYS PER WEEK DO YOU ENGAGE IN MODERATE TO STRENUOUS EXERCISE (LIKE A BRISK WALK)?: 3 DAYS

## 2024-08-12 ASSESSMENT — SOCIAL DETERMINANTS OF HEALTH (SDOH)
HOW OFTEN DO YOU GET TOGETHER WITH FRIENDS OR RELATIVES?: PATIENT DECLINED
HOW OFTEN DO YOU GET TOGETHER WITH FRIENDS OR RELATIVES?: PATIENT DECLINED

## 2024-08-12 ASSESSMENT — PAIN SCALES - GENERAL: PAINLEVEL: MILD PAIN (2)

## 2024-08-12 NOTE — PROGRESS NOTES
Preventive Care Visit  [facility]  DEE GRIMES PA-C, Physician Assistant - Medical  Aug 12, 2024      Assessment & Plan     Routine general medical examination at a health care facility  Continue eye and dental care  Immunizations updated  Mammo completed in April    Cervical cancer screening  - Pap Screen with HPV - Recommended Age 30 - 65 Years    Lipid screening  - Lipid panel reflex to direct LDL Non-fasting; Future    Screening for diabetes mellitus  - Hemoglobin A1c; Future    Colon cancer screening  - COLOGUARD([employer] SCI[employer]); Future    Encounter for immunization  Tdap and pneumococcal    Multinodular thyroid   MRI cervical spine  2. Heterogeneous, multinodular left thyroid lobe. Some of these nodules may have increased in size from 7/22/2015 ultrasound study. Recommend repeat ultrasound evaluation, if not recently performed. 7/21/24  - TSH with free T4 reflex; Future  - US Thyroid; Future    Rash  Will treat the foot rash with both an antifungal and a steroid to cut down on itching. Enc use of lotion copiously as well.   - clotrimazole-betamethasone (LOTRISONE) 1-0.05 % external cream; Apply topically 2 times daily Use only for 14 days each episode.    Snoring  Pt will try to schedule when boyfriend is able to schedule as well  - Adult Sleep Eval & Management  Referral; Future    LLQ pain and Left Ovarian cyst on US (in 2021)  Due to bleeding with intercourse and hx ovarian cyst with LLQ pain in that area, we will get an updated pelvic US done.       BMI  Estimated body mass index is 38.08 kg/m  as calculated from the following:    Height as of this encounter: 1.524 m (5').    Weight as of this encounter: 88.5 kg (195 lb).       Counseling  Appropriate preventive services were addressed with this patient via screening, questionnaire, or discussion as appropriate for fall prevention, nutrition, physical activity, Tobacco-use cessation, weight loss and cognition.   Checklist reviewing preventive services available has been given to the patient.  Reviewed patient's diet, addressing concerns and/or questions.   She is at risk for lack of exercise and has been provided with information to increase physical activity for the benefit of her well-being.       Subjective   Lissette is a 48 year old, presenting for the following:  Physical (Right itchy foot , pt mentions that athle foot cream , didn't seems to work) and Neck Injury (Pinched nerve, cervical )        8/12/2024     4:15 PM   Additional Questions   Roomed by radha   Accompanied by self         8/12/2024     4:15 PM   Patient Reported Additional Medications   Patient reports taking the following new medications n/a        Health Care Directive  Patient does not have a Health Care Directive or Living Will.    Lissette is a very pleasant 48 year old female who presents to clinic for her preventive exam. She reports having some bleeding after sex despite having an IUD in place. She smokes. She would like to do a Cologuard for colon cancer screening (no known family hx colon cancer). She had a mammogram in April and it was normal. She had a cervical spine MRI in July (ordered by another provider) and a multinodular thyroid was seen. This needs to be followed up with an Ultrasound and she would like that ordered now. She also had a pelvic US in 2021 in which a left ovarian cyst was noted and the recommendation was follow up US in 2-3 months. Lissette would like that ordered as well. She does have intermittent pain in the LLQ. She has an area of the right plantar aspect of the mid foot that has been itchy for years. She would like that looked at while she is here. She mentions that she snores. She would be open to a sleep medicine evaluation.             8/12/2024   General Health   How would you rate your overall physical health? Good   Feel stress (tense, anxious, or unable to sleep) To some extent      (!) STRESS CONCERN      8/12/2024    Nutrition   Three or more servings of calcium each day? (!) I DON'T KNOW   Diet: Regular (no restrictions)   How many servings of fruit and vegetables per day? (!) 0-1   How many sweetened beverages each day? (!) 2            8/12/2024   Exercise   Days per week of moderate/strenous exercise 3 days   Average minutes spent exercising at this level 20 min            8/12/2024   Social Factors   Frequency of gathering with friends or relatives Patient declined   Worry food won't last until get money to buy more No   Food not last or not have enough money for food? No   Do you have housing? (Housing is defined as stable permanent housing and does not include staying ouside in a car, in a tent, in an abandoned building, in an overnight shelter, or couch-surfing.) Yes   Are you worried about losing your housing? No   Lack of transportation? No   Unable to get utilities (heat,electricity)? No            8/12/2024   Dental   Dentist two times every year? Yes            8/12/2024   TB Screening   Were you born outside of the [place]? No      Today's PHQ-2 Score:       7/24/2024    11:15 AM   PHQ-2 ( 1999 [employer])   Q1: Little interest or pleasure in doing things 0   Q2: Feeling down, depressed or hopeless 0   PHQ-2 Score 0         8/12/2024   Substance Use   If I could quit smoking, I would Somewhat disagree   I want to quit somking, worry about health affects Completely agree   Willing to make a plan to quit smoking Neutral   Willing to cut down before quitting Somewhat disagree   Alcohol more than 3/day or more than 7/wk Not Applicable   Do you use any other substances recreationally? No        Social History     Tobacco Use    Smoking status: Every Day     Current packs/day: 0.50     Types: Cigarettes    Smokeless tobacco: Never   Vaping Use    Vaping status: Former   Substance Use Topics    Alcohol use: Yes    Drug use: No         4/26/2024   LAST FHS-7 RESULTS   1st degree relative breast or ovarian cancer No   Any relative  bilateral breast cancer No   Any male have breast cancer No   Any ONE woman have BOTH breast AND ovarian cancer No   Any woman with breast cancer before 50yrs No   2 or more relatives with breast AND/OR ovarian cancer No   2 or more relatives with breast AND/OR bowel cancer No        Mammogram Screening - Mammogram every 1-2 years updated in Health Maintenance based on mutual decision making        2024   STI Screening   New sexual partner(s) since last STI/HIV test? No        History of abnormal Pap smear: No - age 30- 64 PAP with HPV every 5 years recommended        Latest Ref Rng & Units 10/30/2017     8:02 AM 10/30/2017     8:00 AM   PAP / HPV   PAP (Historical)  NIL     HPV 16 DNA NEG^Negative  Negative    HPV 18 DNA NEG^Negative  Negative    Other HR HPV NEG^Negative  Negative      ASCVD Risk   The ASCVD Risk score (Juan MEDINA, et al., 2019) failed to calculate for the following reasons:    Cannot find a previous HDL lab    Cannot find a previous total cholesterol lab       Reviewed and updated as needed this visit by Provider                    Past Medical History:   Diagnosis Date    NO ACTIVE PROBLEMS      Past Surgical History:   Procedure Laterality Date     SECTION      NECK SURGERY      2 disks and a plate    NO HISTORY OF SURGERY      THYROIDECTOMY       TUBAL LIGATION      WRIST GANGLION EXCISION       OB History    Para Term  AB Living   3 1 1 0 2 1   SAB IAB Ectopic Multiple Live Births   1 1 0 0 1      # Outcome Date GA Lbr Don[place]on/2nd Weight Sex Type Anes PTL Lv   3 Term  40w0d   F CS-LTranv   JANI   2 IAB            1 SAB              BP Readings from Last 3 Encounters:   24 136/89   24 (!) 142/88   24 136/78    Wt Readings from Last 3 Encounters:   24 88.5 kg (195 lb)   24 87.5 kg (193 lb)   24 87.8 kg (193 lb 9.6 oz)                  Patient Active Problem List   Diagnosis    CARDIOVASCULAR SCREENING; LDL GOAL LESS THAN  160    Itching    Dysmenorrhea     Past Surgical History:   Procedure Laterality Date     SECTION  2002    NECK SURGERY      2 disks and a plate    NO HISTORY OF SURGERY      THYROIDECTOMY       TUBAL LIGATION      WRIST GANGLION EXCISION         Social History     Tobacco Use    Smoking status: Every Day     Current packs/day: 0.50     Types: Cigarettes    Smokeless tobacco: Never   Substance Use Topics    Alcohol use: Yes     Family History   Problem Relation Age of Onset    Hypertension Mother     Cancer Maternal Grandfather         prostate    No Known Problems Father          Current Outpatient Medications   Medication Sig Dispense Refill    clotrimazole-betamethasone (LOTRISONE) 1-0.05 % external cream Apply topically 2 times daily Use only for 14 days each episode. 45 g 0    gabapentin (NEURONTIN) 300 MG capsule Take 1 capsule (300 mg) by mouth 3 times daily According to the chart in the visit summary 90 capsule 3    ibuprofen (ADVIL/MOTRIN) 200 MG tablet Take 200 mg by mouth every 4 hours as needed for pain (Patient not taking: Reported on 2024)       No Known Allergies  Recent Labs   Lab Test 20  1405 10/30/17  0813   LDL  --  161*   HDL  --  46*   TRIG  --  129   ALT 23  --    CR 0.75  --    GFRESTIMATED >90  --    GFRESTBLACK >90  --    POTASSIUM 4.2  --    TSH 1.58 1.50          Review of Systems  Constitutional, HEENT, cardiovascular, pulmonary, GI, , musculoskeletal, neuro, skin, endocrine and psych systems are negative, except as otherwise noted.     Objective    Exam  /89   Pulse 71   Temp 98.3  F (36.8  C) (Tympanic)   Resp 12   Ht 1.524 m (5')   Wt 88.5 kg (195 lb)   LMP  (LMP Unknown)   SpO2 99%   BMI 38.08 kg/m     Estimated body mass index is 38.08 kg/m  as calculated from the following:    Height as of this encounter: 1.524 m (5').    Weight as of this encounter: 88.5 kg (195 lb).    Physical Exam  Vitals reviewed.   Constitutional:       Appearance: Normal  appearance. She is obese. She is not ill-appearing or toxic-appearing.   HENT:      Right Ear: Tympanic membrane and ear canal normal.      Left Ear: Tympanic membrane and ear canal normal.      Nose: No congestion.      Mouth/Throat:      Mouth: Mucous membranes are moist.      Pharynx: Oropharynx is clear. No oropharyngeal exudate or posterior oropharyngeal erythema.      Comments: Mallampati IV  Eyes:      Conjunctiva/sclera: Conjunctivae normal.      Pupils: Pupils are equal, round, and reactive to light.   Neck:      Thyroid: No thyroid tenderness.      Comments: Thick neck  Nodular thyroid  Cardiovascular:      Rate and Rhythm: Normal rate and regular rhythm.      Heart sounds: Normal heart sounds, S1 normal and S2 normal. No murmur heard.  Pulmonary:      Effort: Pulmonary effort is normal.      Breath sounds: Normal breath sounds.   Abdominal:      General: Abdomen is flat. Bowel sounds are normal.      Palpations: Abdomen is soft.      Tenderness: There is no abdominal tenderness.   Genitourinary:     General: Normal vulva.      Vagina: Normal.      Cervix: Normal.      Comments: IUD string visible  Musculoskeletal:        Feet:       Comments: Ambulatory without assistive device.    Feet:      Comments: Dry patch of skin with callused appearance and one area that has been scratched open. Otherwise, skin intact and there is some scaling to the area. Pt states she has hx of eczema and is not good at applying lotion. Also played sports in her youth.   Lymphadenopathy:      Cervical: No cervical adenopathy.      Upper Body:      Right upper body: No supraclavicular adenopathy.      Left upper body: No supraclavicular adenopathy.   Skin:     General: Skin is warm and dry.      Comments: See foot exam.    Neurological:      General: No focal deficit present.      Mental Status: She is alert and oriented to person, place, and time.   Psychiatric:         Mood and Affect: Mood normal.         Speech: Speech  normal.         Behavior: Behavior normal. Behavior is cooperative.         Thought Content: Thought content normal.         Cognition and Memory: Cognition normal.         Signed Electronically by: DEE GRIMES PA-C

## 2024-08-12 NOTE — NURSING NOTE
Prior to immunization administration, verified patients identity using patient s name and date of birth. Please see Immunization Activity for additional information.     Screening Questionnaire for Adult Immunization    Are you sick today?   No   Do you have allergies to medications, food, a vaccine component or latex?   No   Have you ever had a serious reaction after receiving a vaccination?   No   Do you have a long-term health problem with heart, lung, kidney, or metabolic disease (e.g., diabetes), asthma, a blood disorder, no spleen, complement component deficiency, a cochlear implant, or a spinal fluid leak?  Are you on long-term aspirin therapy?   No   Do you have cancer, leukemia, HIV/AIDS, or any other immune system problem?   No   Do you have a parent, brother, or sister with an immune system problem?   No   In the past 3 months, have you taken medications that affect  your immune system, such as prednisone, other steroids, or anticancer drugs; drugs for the treatment of rheumatoid arthritis, Crohn s disease, or psoriasis; or have you had radiation treatments?   No   Have you had a seizure, or a brain or other nervous system problem?   No   During the past year, have you received a transfusion of blood or blood    products, or been given immune (gamma) globulin or antiviral drug?   No   For women: Are you pregnant or is there a chance you could become       pregnant during the next month?   No   Have you received any vaccinations in the past 4 weeks?   No     Immunization questionnaire answers were all negative.      Patient instructed to remain in clinic for 15 minutes afterwards, and to report any adverse reactions.     Screening performed by Domenica Kyle CMA on 8/12/2024 at 5:09 PM.

## 2024-08-12 NOTE — PATIENT INSTRUCTIONS
Patient Education   Preventive Care Advice   This is general advice given by our system to help you stay healthy. However, your care team may have specific advice just for you. Please talk to your care team about your preventive care needs.  Nutrition  Eat 5 or more servings of fruits and vegetables each day.  Try wheat bread, brown rice and whole grain pasta (instead of white bread, rice, and pasta).  Get enough calcium and vitamin D. Check the label on foods and aim for 100% of the RDA (recommended daily allowance).  Lifestyle  Exercise at least 150 minutes each week  (30 minutes a day, 5 days a week).  Do muscle strengthening activities 2 days a week. These help control your weight and prevent disease.  No smoking.  Wear sunscreen to prevent skin cancer.  Have a dental exam and cleaning every 6 months.  Yearly exams  See your health care team every year to talk about:  Any changes in your health.  Any medicines your care team has prescribed.  Preventive care, family planning, and ways to prevent chronic diseases.  Shots (vaccines)   HPV shots (up to age 26), if you've never had them before.  Hepatitis B shots (up to age 59), if you've never had them before.  COVID-19 shot: Get this shot when it's due.  Flu shot: Get a flu shot every year.  Tetanus shot: Get a tetanus shot every 10 years.  Pneumococcal, hepatitis A, and RSV shots: Ask your care team if you need these based on your risk.  Shingles shot (for age 50 and up)  General health tests  Diabetes screening:  Starting at age 35, Get screened for diabetes at least every 3 years.  If you are younger than age 35, ask your care team if you should be screened for diabetes.  Cholesterol test: At age 39, start having a cholesterol test every 5 years, or more often if advised.  Bone density scan (DEXA): At age 50, ask your care team if you should have this scan for osteoporosis (brittle bones).  Hepatitis C: Get tested at least once in your life.  STIs (sexually  transmitted infections)  Before age 24: Ask your care team if you should be screened for STIs.  After age 24: Get screened for STIs if you're at risk. You are at risk for STIs (including HIV) if:  You are sexually active with more than one person.  You don't use condoms every time.  You or a partner was diagnosed with a sexually transmitted infection.  If you are at risk for HIV, ask about PrEP medicine to prevent HIV.  Get tested for HIV at least once in your life, whether you are at risk for HIV or not.  Cancer screening tests  Cervical cancer screening: If you have a cervix, begin getting regular cervical cancer screening tests starting at age 21.  Breast cancer scan (mammogram): If you've ever had breasts, begin having regular mammograms starting at age 40. This is a scan to check for breast cancer.  Colon cancer screening: It is important to start screening for colon cancer at age 45.  Have a colonoscopy test every 10 years (or more often if you're at risk) Or, ask your provider about stool tests like a FIT test every year or Cologuard test every 3 years.  To learn more about your testing options, visit:   .  For help making a decision, visit:   https://bit.ly/py56215.  Prostate cancer screening test: If you have a prostate, ask your care team if a prostate cancer screening test (PSA) at age 55 is right for you.  Lung cancer screening: If you are a current or former smoker ages 50 to 80, ask your care team if ongoing lung cancer screenings are right for you.  For informational purposes only. Not to replace the advice of your health care provider. Copyright   2023 [employer]. All rights reserved. Clinically reviewed by the [facility] Transitions Program. [employer] [id number] - REV 01/24.

## 2024-08-13 LAB
HPV HR 12 DNA CVX QL NAA+PROBE: NEGATIVE
HPV16 DNA CVX QL NAA+PROBE: NEGATIVE
HPV18 DNA CVX QL NAA+PROBE: NEGATIVE
HUMAN PAPILLOMA VIRUS FINAL DIAGNOSIS: NORMAL

## 2024-08-16 LAB
BKR LAB AP GYN ADEQUACY: NORMAL
BKR LAB AP GYN INTERPRETATION: NORMAL
BKR LAB AP PREVIOUS ABNORMAL: NORMAL
PATH REPORT.COMMENTS IMP SPEC: NORMAL
PATH REPORT.COMMENTS IMP SPEC: NORMAL
PATH REPORT.RELEVANT HX SPEC: NORMAL

## 2024-08-20 NOTE — TELEPHONE ENCOUNTER
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 recurrent radicular symptoms.  Cervical radiculopathy [M54.12]  S/P cervical spinal fusion [Z98.1]  Upper back pain [M54.9]  Numbness and tingling [R20.0, R20.2]  Neck pain [M54.2]  Muscle spasm       IMAGING STATUS/LOCATION DATE/TYPE   MRI ITNERNAL/IMAGING 7/21/2024  MR CERVICAL SPINE W/O and W CONTRAST   CT NA    XRAY INTERNAL/IMAGING 7/30/2024  XR CERVICAL SPINE FLEX/EXT 2/3 VIEWS   NOTES     Other specialist OVN: NA    EMG Scheduled  10/9/2024  Right. Right upper extremity   INJECTION NA    PHYSICAL THERAPY NA    SURGERY NA

## 2024-09-09 ENCOUNTER — PRE VISIT (OUTPATIENT)
Dept: NEUROSURGERY | Facility: CLINIC | Age: 49
End: 2024-09-09

## 2024-09-23 ENCOUNTER — TELEPHONE (OUTPATIENT)
Dept: FAMILY MEDICINE | Facility: CLINIC | Age: 49
End: 2024-09-23
Payer: COMMERCIAL

## 2024-09-23 NOTE — TELEPHONE ENCOUNTER
Patient Quality Outreach    Patient is due for the following:   Colon Cancer Screening    Next Steps:   Turn in cologuard    Type of outreach:    Sent [employer] message.      Questions for provider review:    None           Cherie Johnson MA

## 2025-03-07 NOTE — PROGRESS NOTES
SUBJECTIVE:   Lissette England is a 42 year old female who presents to clinic today for the following health issues:    Smoking cessation       Musculoskeletal problem/pain      Duration: 3 months    Description  Location: Left great toe    Intensity:  Mild-mod    Accompanying signs and symptoms: none    History  Previous similar problem: no   Previous evaluation:  none    Precipitating or alleviating factors:  Trauma or overuse: had an injury 20 years ago  Aggravating factors include: wearing shoes    Therapies tried and outcome: stretching      Neck Pain Follow Up      Description:   Location of pain:  right  Character of pain: dull ache and waxing and waning  Pain radiation: into right arm - tingling, dull ache  Since last visit, pain is:  worsened  New numbness or weakness in legs, not attributed to pain:  no     Muscle tightness  Had double discectomy in 2015  Last MRI 2 years ago  Denies weakness of arm  Sometimes arm/hand will twitch      Accompanying Signs & Symptoms:  Risk of Fracture:  None  Risk of Cauda Equina:  None  Risk of Infection:  None  Risk of Cancer:  None      _________________________________________  _________________________________________      Problem list and histories reviewed & adjusted, as indicated.  Additional history: as documented    Patient Active Problem List   Diagnosis     CARDIOVASCULAR SCREENING; LDL GOAL LESS THAN 160     No past surgical history on file.    Social History   Substance Use Topics     Smoking status: Current Every Day Smoker     Types: Cigarettes     Smokeless tobacco: Never Used     Alcohol use Yes     Family History   Problem Relation Age of Onset     Hypertension Mother              Reviewed and updated as needed this visit by clinical staff     Reviewed and updated as needed this visit by Provider         ROS:  Constitutional, neuro, musculoskeletal, integument and psychiatric systems are negative, except as otherwise noted.      OBJECTIVE:                   Elaine  from [facility] called to let us know per my request.                                     /84 (BP Location: Left arm, Patient Position: Sitting, Cuff Size: Adult Regular)  Pulse 76  Temp 98.6  F (37  C) (Oral)  Resp 16  Wt 188 lb 9.6 oz (85.5 kg)  SpO2 96%  BMI 36.83 kg/m2  Body mass index is 36.83 kg/(m^2).  GENERAL APPEARANCE: healthy, alert and no distress  MS: extremities normal- no gross deformities noted  ORTHO: Foot Exam: Inspection:  redness over dorsum of 1st MTP  Tender::1st metatarsal  Range of Motion:flexion of toes:  full, extension of toes  full    Cervical Spine Exam: Inspection: normal cervical lordosis  Tender:  right trapezius muscles and supraspinatus     NEURO: Normal strength and tone  PSYCH: mentation appears normal and affect normal/bright       ASSESSMENT:                                                      1. Tobacco use    2. Pain of toe of left foot    3. Cervicalgia    4. Cervical radiculopathy    5. Muscle tightness         PLAN:                                                    Will start Zyban. Wean discussed.     Toe pain appears to be due to compression/pressure over the 1st MTP joint. Will obtain an x-ray today. Recommended a wide boxed shoe with loose laces.     MRI of her cervical spine. Will trial prednisone and Flexeril. Follow up with ortho if no improvements.    Patient Instructions   Start Zyban/buproprion as soon as you get the prescription. Your wean process should be as follows (however, you can always quit completely at anytime in the next 3 months):    Starting cigarette intake per day: 10    Week 2: decrease your cigarette intake by 25%, down to 8 cigarettes per day.    Week 4: decrease your cigarette intake by 50%, down to 5 cigarettes per day.    Week 6: decrease your cigarette intake by 75%, down to 3 cigarettes per day.    Week 8: decrease your cigarette intake by 90%, down to 1 cigarettes per day.    Week 10: quit smoking altogether!        The patient was in agreement with the plan today and had no  questions or concerns prior to leaving the clinic.     Charito Hansen PA-C  [facility]

## 2025-03-13 ENCOUNTER — TELEPHONE (OUTPATIENT)
Dept: FAMILY MEDICINE | Facility: CLINIC | Age: 50
End: 2025-03-13
Payer: COMMERCIAL

## 2025-03-13 NOTE — TELEPHONE ENCOUNTER
Patient Quality Outreach    Patient is due for the following:   Colon Cancer Screening    Action(s) Taken:   No follow up needed at this time.    Type of outreach:    Sent [employer] message.    Questions for provider review:    None           Chreie Johnson MA

## 2025-03-20 ENCOUNTER — TELEPHONE (OUTPATIENT)
Dept: FAMILY MEDICINE | Facility: CLINIC | Age: 50
End: 2025-03-20
Payer: COMMERCIAL

## 2025-03-20 NOTE — TELEPHONE ENCOUNTER
Patient Quality Outreach    Patient is due for the following:   Colon Cancer Screening    Action(s) Taken:   Return cologuard    Type of outreach:    Sent [employer] message.    Questions for provider review:    None         Cherie Johnson MA  Chart routed to .

## 2025-06-26 ENCOUNTER — TELEPHONE (OUTPATIENT)
Dept: FAMILY MEDICINE | Facility: CLINIC | Age: 50
End: 2025-06-26
Payer: COMMERCIAL

## 2025-06-26 NOTE — TELEPHONE ENCOUNTER
Patient Quality Outreach    Patient is due for the following:   Colon Cancer Screening    Action(s) Taken:   Please ask pt to return the cologuard. It expires August 12th.    Type of outreach:    Sent to [facility]    Questions for provider review:    None         Cherie Johsnon MA  Chart routed to None.

## 2025-06-26 NOTE — TELEPHONE ENCOUNTER
Contacts       Contact Date/Time Type Contact Phone/Fax    06/26/2025 01:38 PM CDT Phone (Outgoing) EnglandLissette (Self) [phone] (M)    Left Message           Attempted to reach patient to: Relay a message    Regarding: Cologuard    Action to take: OK to relay (verbatim): Remind patient to complete Cologuard and mail it back in, it expires 8 /12/25.    [facility]ier[facility]ARIO [facility]   /12/25

## 2025-06-30 ENCOUNTER — OFFICE VISIT (OUTPATIENT)
Dept: FAMILY MEDICINE | Facility: CLINIC | Age: 50
End: 2025-06-30
Payer: COMMERCIAL

## 2025-06-30 ENCOUNTER — ANCILLARY PROCEDURE (OUTPATIENT)
Dept: GENERAL RADIOLOGY | Facility: CLINIC | Age: 50
End: 2025-06-30
Attending: NURSE PRACTITIONER
Payer: COMMERCIAL

## 2025-06-30 VITALS
DIASTOLIC BLOOD PRESSURE: 86 MMHG | TEMPERATURE: 98.1 F | SYSTOLIC BLOOD PRESSURE: 127 MMHG | WEIGHT: 190.4 LBS | HEART RATE: 80 BPM | OXYGEN SATURATION: 96 % | BODY MASS INDEX: 37.38 KG/M2 | RESPIRATION RATE: 16 BRPM | HEIGHT: 60 IN

## 2025-06-30 DIAGNOSIS — M25.562 ARTHRALGIA OF BOTH KNEES: ICD-10-CM

## 2025-06-30 DIAGNOSIS — M25.562 ARTHRALGIA OF BOTH KNEES: Primary | ICD-10-CM

## 2025-06-30 DIAGNOSIS — M25.561 ARTHRALGIA OF BOTH KNEES: Primary | ICD-10-CM

## 2025-06-30 DIAGNOSIS — M25.561 ARTHRALGIA OF BOTH KNEES: ICD-10-CM

## 2025-06-30 PROCEDURE — 73562 X-RAY EXAM OF KNEE 3: CPT | Mod: TC | Performed by: STUDENT IN AN ORGANIZED HEALTH CARE EDUCATION/TRAINING PROGRAM

## 2025-06-30 PROCEDURE — 99213 OFFICE O/P EST LOW 20 MIN: CPT | Performed by: NURSE PRACTITIONER

## 2025-06-30 PROCEDURE — 1126F AMNT PAIN NOTED NONE PRSNT: CPT | Performed by: NURSE PRACTITIONER

## 2025-06-30 PROCEDURE — 3079F DIAST BP 80-89 MM HG: CPT | Performed by: NURSE PRACTITIONER

## 2025-06-30 PROCEDURE — 3074F SYST BP LT 130 MM HG: CPT | Performed by: NURSE PRACTITIONER

## 2025-06-30 RX ORDER — PSEUDOEPHED/ACETAMINOPH/DIPHEN 30MG-500MG
TABLET ORAL
COMMUNITY
Start: 2025-04-19

## 2025-06-30 ASSESSMENT — PAIN SCALES - GENERAL: PAINLEVEL_OUTOF10: NO PAIN (0)

## 2025-06-30 NOTE — PROGRESS NOTES
Assessment & Plan     Arthralgia of both knees  -ddx considered include OA, patellofemoral pain syndrome, bursitis. Recommend trial of PT, continue acetaminophen and/or ibuprofen as needed for pain/discomfort. Referral to sports med due to issues with feeling like knees giving out.   - XR Knee Bilateral 3 Views; Future  - Orthopedic  Referral; Future  - Physical Therapy  Referral; Future          Nicotine/Tobacco Cessation  She reports that she has been smoking cigarettes. She has never used smokeless tobacco.  Nicotine/Tobacco Cessation Plan  Information offered: Patient not interested at this time      BMI  Estimated body mass index is 37.18 kg/m  as calculated from the following:    Height as of this encounter: 1.524 m (5').    Weight as of this encounter: 86.4 kg (190 lb 6.4 oz).             Usman Mclaughlin is a 49 year old, presenting for the following health issues:  Musculoskeletal Problem        6/30/2025     2:01 PM   Additional Questions   Roomed by Maribel APARICIO   Accompanied by self     Both knees giving out once in awhile, having pain. Took ibuprofen last night which helped. Pain is worse at night compared to morning. Knees do click and crunch. Bending knees makes the pain worse. Sitting with knees flexed/aurora cross makes them hurt worse.     2 weekends ago left knee was very bad, daughter felt it and thought there was fluid around it.     Used to do karate, dance, gymnastics. No known recent traumatic injury. No history of surgery.     Left calf tightness/muscle twitching. Pain is random depending on how active she is.       Musculoskeletal Problem    History of Present Illness       Reason for visit:  Knee troubles   She is taking medications regularly.                      Objective    /86   Pulse 80   Temp 98.1  F (36.7  C) (Tympanic)   Resp 16   Ht 1.524 m (5')   Wt 86.4 kg (190 lb 6.4 oz)   SpO2 96%   BMI 37.18 kg/m    Body mass index is 37.18 kg/m .  Physical  Exam  Constitutional:       Appearance: Normal appearance.   HENT:      Right Ear: External ear normal.      Left Ear: External ear normal.   Eyes:      Pupils: Pupils are equal, round, and reactive to light.   Cardiovascular:      Rate and Rhythm: Normal rate.   Pulmonary:      Effort: Pulmonary effort is normal.   Musculoskeletal:      Right knee: No swelling, erythema or crepitus. Normal range of motion.      Instability Tests: Anterior drawer test negative. Posterior drawer test negative.      Left knee: No swelling, erythema or crepitus. Normal range of motion. Tenderness present over the patellar tendon.      Instability Tests: Anterior drawer test negative. Posterior drawer test negative.   Skin:     General: Skin is warm and dry.   Neurological:      General: No focal deficit present.      Mental Status: She is alert and oriented to person, place, and time.   Psychiatric:         Mood and Affect: Mood normal.         Behavior: Behavior normal.         Thought Content: Thought content normal.         Judgment: Judgment normal.            XR Knee Bilateral 3 Views  Result Date: 7/1/2025  EXAM: XR KNEE BILATERAL 3 [facility]WS [facility] INDICATION:  Arthralgia of both knees, Arthralgia of both knees COMPARISON: None.     IMPRESSION: RIGHT KNEE: Mild tricompartmental degenerative arthritis in the right knee. No fracture or joint effusion. Small calcifications within the popliteal fossa, possibly loose bodies within a popliteal cyst. LEFT KNEE: Mild tricompartmental degenerative arthritis in the left knee. No fracture. Small joint effusion.        Signed Electronically by: ASIA Hernandez CNP

## 2025-07-01 ENCOUNTER — PATIENT OUTREACH (OUTPATIENT)
Dept: CARE COORDINATION | Facility: CLINIC | Age: 50
End: 2025-07-01
Payer: COMMERCIAL

## 2025-07-03 ENCOUNTER — PATIENT OUTREACH (OUTPATIENT)
Dept: CARE COORDINATION | Facility: CLINIC | Age: 50
End: 2025-07-03
Payer: COMMERCIAL

## 2025-07-14 ENCOUNTER — PATIENT OUTREACH (OUTPATIENT)
Dept: CARE COORDINATION | Facility: CLINIC | Age: 50
End: 2025-07-14
Payer: COMMERCIAL